# Patient Record
Sex: MALE | Race: WHITE | Employment: OTHER | URBAN - METROPOLITAN AREA
[De-identification: names, ages, dates, MRNs, and addresses within clinical notes are randomized per-mention and may not be internally consistent; named-entity substitution may affect disease eponyms.]

---

## 2017-05-17 ENCOUNTER — HOSPITAL ENCOUNTER (OUTPATIENT)
Dept: GENERAL RADIOLOGY | Age: 67
Discharge: HOME | End: 2017-05-17
Attending: ORTHOPAEDIC SURGERY

## 2017-05-17 DIAGNOSIS — M25.512 PAIN IN LEFT SHOULDER: ICD-10-CM

## 2017-05-17 PROCEDURE — 73030 X-RAY EXAM OF SHOULDER: CPT | Performed by: ORTHOPAEDIC SURGERY

## 2020-08-03 ENCOUNTER — OFFICE VISIT (OUTPATIENT)
Dept: INTERNAL MEDICINE CLINIC | Facility: CLINIC | Age: 70
End: 2020-08-03
Payer: COMMERCIAL

## 2020-08-03 VITALS
OXYGEN SATURATION: 98 % | DIASTOLIC BLOOD PRESSURE: 78 MMHG | RESPIRATION RATE: 19 BRPM | SYSTOLIC BLOOD PRESSURE: 130 MMHG | HEART RATE: 75 BPM | BODY MASS INDEX: 29.47 KG/M2 | HEIGHT: 67.5 IN | WEIGHT: 190 LBS | TEMPERATURE: 98 F

## 2020-08-03 DIAGNOSIS — D17.23 LIPOMA OF RIGHT LOWER EXTREMITY: ICD-10-CM

## 2020-08-03 DIAGNOSIS — Z00.00 GENERAL MEDICAL EXAM: Primary | ICD-10-CM

## 2020-08-03 DIAGNOSIS — D22.9 ATYPICAL NEVI: ICD-10-CM

## 2020-08-03 DIAGNOSIS — E66.3 OVERWEIGHT (BMI 25.0-29.9): ICD-10-CM

## 2020-08-03 DIAGNOSIS — L72.3 SEBACEOUS CYST OF LEFT AXILLA: ICD-10-CM

## 2020-08-03 DIAGNOSIS — I10 ESSENTIAL HYPERTENSION: ICD-10-CM

## 2020-08-03 PROCEDURE — 3075F SYST BP GE 130 - 139MM HG: CPT | Performed by: INTERNAL MEDICINE

## 2020-08-03 PROCEDURE — 3078F DIAST BP <80 MM HG: CPT | Performed by: INTERNAL MEDICINE

## 2020-08-03 PROCEDURE — 99204 OFFICE O/P NEW MOD 45 MIN: CPT | Performed by: INTERNAL MEDICINE

## 2020-08-03 PROCEDURE — 3008F BODY MASS INDEX DOCD: CPT | Performed by: INTERNAL MEDICINE

## 2020-08-03 RX ORDER — LISINOPRIL 20 MG/1
TABLET ORAL
COMMUNITY
Start: 2020-07-16 | End: 2021-03-22

## 2020-08-03 RX ORDER — METHYLPREDNISOLONE 4 MG/1
TABLET ORAL
COMMUNITY
Start: 2020-02-06 | End: 2020-10-02 | Stop reason: ALTCHOICE

## 2020-08-03 RX ORDER — FLUTICASONE PROPIONATE 50 MCG
SPRAY, SUSPENSION (ML) NASAL
COMMUNITY
Start: 2020-02-06 | End: 2020-10-02 | Stop reason: ALTCHOICE

## 2020-08-03 NOTE — PROGRESS NOTES
HPI:    Patient ID: Mackenzie Jones is a 81year old male. HPI   Here to establish care. Previous PCP, Dr. Boy Nielsen. H/o HTN . Stated he otherwise feels healthy. Home BP usually 120/80s.  Plays double tennis , does push ups with no chest pain, TRUONG., diz problems.             Current Outpatient Medications   Medication Sig Dispense Refill   • Fluticasone Propionate 50 MCG/ACT Nasal Suspension      • lisinopril 20 MG Oral Tab      • methylPREDNISolone 4 MG Oral Tablet Therapy Pack        Allergies:  Wasps if size increases. - DERM - INTERNAL      6. Sebaceous cyst of left axilla    - DERM - INTERNAL    7. Obtain old records including colonoscopy report.        Imaging & Referrals:  DERM - INTERNAL  DERM - INTERNAL         F/u in 4 mos

## 2020-08-04 ENCOUNTER — NURSE ONLY (OUTPATIENT)
Dept: INTERNAL MEDICINE CLINIC | Facility: CLINIC | Age: 70
End: 2020-08-04
Payer: COMMERCIAL

## 2020-08-04 DIAGNOSIS — Z12.5 SCREENING FOR PROSTATE CANCER: ICD-10-CM

## 2020-08-04 DIAGNOSIS — E66.3 OVERWEIGHT (BMI 25.0-29.9): ICD-10-CM

## 2020-08-04 DIAGNOSIS — I10 ESSENTIAL HYPERTENSION: Primary | ICD-10-CM

## 2020-08-04 DIAGNOSIS — Z00.00 GENERAL MEDICAL EXAM: ICD-10-CM

## 2020-08-04 PROCEDURE — 36415 COLL VENOUS BLD VENIPUNCTURE: CPT | Performed by: INTERNAL MEDICINE

## 2020-08-04 NOTE — PROGRESS NOTES
Patient presented in office today for fasting blood draw. Blood draw successful in right arm  Dane:  Cbc,cmp,lipid,psa  D. O.B verified   Orders per Doctor Co

## 2020-08-05 LAB
ABSOLUTE BASOPHILS: 58 CELLS/UL (ref 0–200)
ABSOLUTE EOSINOPHILS: 148 CELLS/UL (ref 15–500)
ABSOLUTE LYMPHOCYTES: 1839 CELLS/UL (ref 850–3900)
ABSOLUTE MONOCYTES: 482 CELLS/UL (ref 200–950)
ABSOLUTE NEUTROPHILS: 2772 CELLS/UL (ref 1500–7800)
ALBUMIN/GLOBULIN RATIO: 2 (CALC) (ref 1–2.5)
ALBUMIN: 4.3 G/DL (ref 3.6–5.1)
ALKALINE PHOSPHATASE: 59 U/L (ref 35–144)
ALT: 47 U/L (ref 9–46)
AST: 25 U/L (ref 10–35)
BASOPHILS: 1.1 %
BILIRUBIN, TOTAL: 0.7 MG/DL (ref 0.2–1.2)
BUN: 15 MG/DL (ref 7–25)
CALCIUM: 9.6 MG/DL (ref 8.6–10.3)
CARBON DIOXIDE: 29 MMOL/L (ref 20–32)
CHLORIDE: 107 MMOL/L (ref 98–110)
CHOL/HDLC RATIO: 4.3 (CALC)
CHOLESTEROL, TOTAL: 224 MG/DL
CREATININE: 1.02 MG/DL (ref 0.7–1.25)
EGFR IF AFRICN AM: 88 ML/MIN/1.73M2
EGFR IF NONAFRICN AM: 76 ML/MIN/1.73M2
EOSINOPHILS: 2.8 %
GLOBULIN: 2.2 G/DL (CALC) (ref 1.9–3.7)
GLUCOSE: 129 MG/DL (ref 65–99)
HDL CHOLESTEROL: 52 MG/DL
HEMATOCRIT: 43 % (ref 38.5–50)
HEMOGLOBIN: 14.9 G/DL (ref 13.2–17.1)
LDL-CHOLESTEROL: 145 MG/DL (CALC)
LYMPHOCYTES: 34.7 %
MCH: 30.2 PG (ref 27–33)
MCHC: 34.7 G/DL (ref 32–36)
MCV: 87 FL (ref 80–100)
MONOCYTES: 9.1 %
MPV: 10.2 FL (ref 7.5–12.5)
NEUTROPHILS: 52.3 %
NON-HDL CHOLESTEROL: 172 MG/DL (CALC)
PLATELET COUNT: 201 THOUSAND/UL (ref 140–400)
POTASSIUM: 5 MMOL/L (ref 3.5–5.3)
PROTEIN, TOTAL: 6.5 G/DL (ref 6.1–8.1)
PSA, TOTAL: 0.6 NG/ML
RDW: 12.8 % (ref 11–15)
RED BLOOD CELL COUNT: 4.94 MILLION/UL (ref 4.2–5.8)
SODIUM: 144 MMOL/L (ref 135–146)
TRIGLYCERIDES: 147 MG/DL
WHITE BLOOD CELL COUNT: 5.3 THOUSAND/UL (ref 3.8–10.8)

## 2020-08-05 NOTE — PATIENT INSTRUCTIONS
Lifestyle Modification:   (AVG. SBP = Average Systolic Blood Pressure)  Lifestyle Modification Recommendations  Modification Recommendation Avg.  SBP Reduction Range   Weight Reduction Maintain normal body weight (body mass index 18.5-24.9 kg/m2) 5-20 mmHg/ Choose foods from each of the food groups below each day. Try to get the recommended number of servings for each food group.  The serving numbers are based on a diet of 2,000 calories a day. Talk with your healthcare provider if you’re not sure about your c Best choices: Dry roasted nuts with no salt added, lentils, kidney beans, garbanzo beans, and whole lees beans.    Fats and oils  Servings: 2 to 3 a day  A serving is:  · 1 teaspoon vegetable oil  · 1 teaspoon soft margarine  · 1 tablespoon mayonnaise  · 2

## 2020-08-10 RX ORDER — ATORVASTATIN CALCIUM 20 MG/1
20 TABLET, FILM COATED ORAL NIGHTLY
Qty: 90 TABLET | Refills: 2 | Status: SHIPPED | OUTPATIENT
Start: 2020-08-10 | End: 2021-03-22

## 2020-08-13 ENCOUNTER — TELEPHONE (OUTPATIENT)
Dept: INTERNAL MEDICINE CLINIC | Facility: CLINIC | Age: 70
End: 2020-08-13

## 2020-08-13 NOTE — TELEPHONE ENCOUNTER
----- Message from Rekha Nunez MD sent at 8/10/2020 12:18 AM CDT -----  Please Call. Lab showed normal blood count. , prostate enzyme. Fasting glucose is high indicating he is pre diabetic. He should strictly reduce carbs intake.  Avoid bread, rice, pa

## 2020-10-03 DIAGNOSIS — Z12.11 COLON CANCER SCREENING: ICD-10-CM

## 2020-10-03 DIAGNOSIS — Z11.59 ENCOUNTER FOR HEPATITIS C SCREENING TEST FOR LOW RISK PATIENT: Primary | ICD-10-CM

## 2020-10-03 DIAGNOSIS — R73.01 FASTING HYPERGLYCEMIA: ICD-10-CM

## 2020-10-03 PROBLEM — E78.2 MIXED HYPERLIPIDEMIA: Status: ACTIVE | Noted: 2020-10-03

## 2020-10-03 PROBLEM — R79.89 ELEVATED LFTS: Status: ACTIVE | Noted: 2020-10-03

## 2020-10-12 ENCOUNTER — OFFICE VISIT (OUTPATIENT)
Dept: DERMATOLOGY CLINIC | Facility: CLINIC | Age: 70
End: 2020-10-12
Payer: MEDICARE

## 2020-10-12 DIAGNOSIS — D48.5 NEOPLASM OF UNCERTAIN BEHAVIOR OF SKIN: Primary | ICD-10-CM

## 2020-10-12 DIAGNOSIS — D23.5 BENIGN NEOPLASM OF SKIN OF TRUNK, EXCEPT SCROTUM: ICD-10-CM

## 2020-10-12 DIAGNOSIS — L82.1 SEBORRHEIC KERATOSES: ICD-10-CM

## 2020-10-12 DIAGNOSIS — L81.4 LENTIGO: ICD-10-CM

## 2020-10-12 DIAGNOSIS — D23.4 BENIGN NEOPLASM OF SCALP AND SKIN OF NECK: ICD-10-CM

## 2020-10-12 DIAGNOSIS — D22.9 MULTIPLE NEVI: ICD-10-CM

## 2020-10-12 DIAGNOSIS — D23.60 BENIGN NEOPLASM OF SKIN OF UPPER LIMB, INCLUDING SHOULDER, UNSPECIFIED LATERALITY: ICD-10-CM

## 2020-10-12 DIAGNOSIS — D23.30 BENIGN NEOPLASM OF SKIN OF FACE: ICD-10-CM

## 2020-10-12 PROCEDURE — 99202 OFFICE O/P NEW SF 15 MIN: CPT | Performed by: DERMATOLOGY

## 2020-10-12 PROCEDURE — 11102 TANGNTL BX SKIN SINGLE LES: CPT | Performed by: DERMATOLOGY

## 2020-10-19 NOTE — PROGRESS NOTES
Sadaf Thao is a 81year old male. HPI:     CC:  Patient presents with:  Full Skin Exam: New pt. pt presenting today with full body skin check. Denies family and personal HX of skin cancer.         Allergies:  Wasps    HISTORY:    Past Medical History: Social connections        Talks on phone: Not on file        Gets together: Not on file        Attends [employer] service: Not on file        Active member of club or organization: Not on file        Attends meetings of clubs or organizations: Not on bora conjunctival mucosa, eyelids, lips external ears, back, chest,/ breasts, axillae,  abdomen, arms, legs, palms.      Multiple light to medium brown, well marginated, uniformly pigmented, macules and papules 6 mm and less scattered on exam. pigmented lesions cherry angiomas:  Reassurance regarding other benign skin lesions. Signs and symptoms of skin cancer, ABCDE's of melanoma discussed with patient. Sunscreen use, sun protection, self exams reviewed.   Followup as noted RTC routine checkup 6 mos - one year or

## 2020-10-19 NOTE — PROGRESS NOTES
Operative Report                     Shave/  Tangential biopsy     Clinical diagnosis:    Size of lesion:    Location:Diagnosis/Clinical History: irregular dark brown lesion  Spec 1 Description >>>>>: upper back (left)  Spec 1 Comment: 5mm dark brown

## 2020-10-27 NOTE — PROGRESS NOTES
The pathology report from last visit showed  Atypical nevus. This is moderately atypical so should have re-excision. Suggest 30min appt. Not emergent. F/u for skin check in 3-4 mos . Please log in test results.   Please call patient and inform of results

## 2020-11-03 ENCOUNTER — TELEPHONE (OUTPATIENT)
Dept: DERMATOLOGY CLINIC | Facility: CLINIC | Age: 70
End: 2020-11-03

## 2020-11-03 NOTE — PROGRESS NOTES
Pt informed of pathology results and KMT's recommendations for treatment. Pt verbalized understanding and states he will have his spouse call our office back to schedule surgery with Melinda Velazquez. Pt gave verbal approval to speak with spouse.

## 2020-11-09 NOTE — PROGRESS NOTES
S/w pt's spouse as she called back to go over the procedure, spouse wanted to check \"if this is really necessary to remove\" informed that yes it is, not emergent but neccessary as per KMT'r order.   Spouse states she will discuss this further with her hus

## 2021-01-05 NOTE — PROGRESS NOTES
Called pt to follow up on surgery that he has not yet scheduled - pt states he wants to wait now due to Covid - please advise how long he can wait to have this excised?

## 2021-02-16 NOTE — PROGRESS NOTES
LMTCB - this pt needs to see [address] for 30 min re-excision by March 2021 - see below KMT's msgs. Please let him know when he calls back/schedule appt.

## 2021-03-13 DIAGNOSIS — Z23 NEED FOR VACCINATION: ICD-10-CM

## 2021-03-22 ENCOUNTER — HOSPITAL ENCOUNTER (OUTPATIENT)
Facility: HOSPITAL | Age: 71
Setting detail: OBSERVATION
Discharge: HOME | End: 2021-03-23
Admitting: HOSPITALIST
Payer: MEDICARE

## 2021-03-22 ENCOUNTER — APPOINTMENT (OUTPATIENT)
Dept: CT IMAGING | Facility: HOSPITAL | Age: 71
End: 2021-03-22
Attending: Other
Payer: MEDICARE

## 2021-03-22 ENCOUNTER — OFFICE VISIT (OUTPATIENT)
Dept: INTERNAL MEDICINE CLINIC | Facility: CLINIC | Age: 71
End: 2021-03-22
Payer: MEDICARE

## 2021-03-22 ENCOUNTER — APPOINTMENT (OUTPATIENT)
Dept: CT IMAGING | Facility: HOSPITAL | Age: 71
End: 2021-03-22
Payer: MEDICARE

## 2021-03-22 ENCOUNTER — APPOINTMENT (OUTPATIENT)
Dept: MRI IMAGING | Facility: HOSPITAL | Age: 71
End: 2021-03-22
Attending: EMERGENCY MEDICINE
Payer: MEDICARE

## 2021-03-22 VITALS
HEIGHT: 67 IN | HEART RATE: 72 BPM | BODY MASS INDEX: 31.23 KG/M2 | DIASTOLIC BLOOD PRESSURE: 82 MMHG | SYSTOLIC BLOOD PRESSURE: 143 MMHG | OXYGEN SATURATION: 96 % | WEIGHT: 199 LBS

## 2021-03-22 DIAGNOSIS — R20.0 NUMBNESS AND TINGLING OF RIGHT ARM AND LEG: Primary | ICD-10-CM

## 2021-03-22 DIAGNOSIS — R20.2 NUMBNESS AND TINGLING OF RIGHT ARM AND LEG: Primary | ICD-10-CM

## 2021-03-22 DIAGNOSIS — G45.9 BRAIN TIA: Primary | ICD-10-CM

## 2021-03-22 DIAGNOSIS — I10 ESSENTIAL HYPERTENSION: ICD-10-CM

## 2021-03-22 LAB
ANION GAP SERPL CALC-SCNC: 8 MMOL/L (ref 0–18)
BASOPHILS # BLD AUTO: 0.04 X10(3) UL (ref 0–0.2)
BASOPHILS NFR BLD AUTO: 0.8 %
BUN BLD-MCNC: 18 MG/DL (ref 7–18)
BUN/CREAT SERPL: 19.4 (ref 10–20)
CALCIUM BLD-MCNC: 8.8 MG/DL (ref 8.5–10.1)
CHLORIDE SERPL-SCNC: 109 MMOL/L (ref 98–112)
CHOLEST SMN-MCNC: 209 MG/DL (ref ?–200)
CO2 SERPL-SCNC: 25 MMOL/L (ref 21–32)
CREAT BLD-MCNC: 0.93 MG/DL
DEPRECATED RDW RBC AUTO: 40.5 FL (ref 35.1–46.3)
EOSINOPHIL # BLD AUTO: 0.05 X10(3) UL (ref 0–0.7)
EOSINOPHIL NFR BLD AUTO: 1 %
ERYTHROCYTE [DISTWIDTH] IN BLOOD BY AUTOMATED COUNT: 12.9 % (ref 11–15)
GLUCOSE BLD-MCNC: 104 MG/DL (ref 70–99)
GLUCOSE BLDC GLUCOMTR-MCNC: 104 MG/DL (ref 70–99)
GLUCOSE BLDC GLUCOMTR-MCNC: 120 MG/DL (ref 70–99)
HCT VFR BLD AUTO: 40.4 %
HDLC SERPL-MCNC: 63 MG/DL (ref 40–59)
HGB BLD-MCNC: 13.7 G/DL
IMM GRANULOCYTES # BLD AUTO: 0.01 X10(3) UL (ref 0–1)
IMM GRANULOCYTES NFR BLD: 0.2 %
LDLC SERPL CALC-MCNC: 107 MG/DL (ref ?–100)
LYMPHOCYTES # BLD AUTO: 1.18 X10(3) UL (ref 1–4)
LYMPHOCYTES NFR BLD AUTO: 24.2 %
MCH RBC QN AUTO: 29.5 PG (ref 26–34)
MCHC RBC AUTO-ENTMCNC: 33.9 G/DL (ref 31–37)
MCV RBC AUTO: 87.1 FL
MONOCYTES # BLD AUTO: 0.47 X10(3) UL (ref 0.1–1)
MONOCYTES NFR BLD AUTO: 9.6 %
NEUTROPHILS # BLD AUTO: 3.13 X10 (3) UL (ref 1.5–7.7)
NEUTROPHILS # BLD AUTO: 3.13 X10(3) UL (ref 1.5–7.7)
NEUTROPHILS NFR BLD AUTO: 64.2 %
NONHDLC SERPL-MCNC: 146 MG/DL (ref ?–130)
OSMOLALITY SERPL CALC.SUM OF ELEC: 296 MOSM/KG (ref 275–295)
PLATELET # BLD AUTO: 186 10(3)UL (ref 150–450)
POTASSIUM SERPL-SCNC: 3.9 MMOL/L (ref 3.5–5.1)
RBC # BLD AUTO: 4.64 X10(6)UL
SARS-COV-2 RNA RESP QL NAA+PROBE: NOT DETECTED
SODIUM SERPL-SCNC: 142 MMOL/L (ref 136–145)
TRIGL SERPL-MCNC: 193 MG/DL (ref 30–149)
VLDLC SERPL CALC-MCNC: 39 MG/DL (ref 0–30)
WBC # BLD AUTO: 4.9 X10(3) UL (ref 4–11)

## 2021-03-22 PROCEDURE — 3077F SYST BP >= 140 MM HG: CPT | Performed by: INTERNAL MEDICINE

## 2021-03-22 PROCEDURE — 3008F BODY MASS INDEX DOCD: CPT | Performed by: INTERNAL MEDICINE

## 2021-03-22 PROCEDURE — 99213 OFFICE O/P EST LOW 20 MIN: CPT | Performed by: INTERNAL MEDICINE

## 2021-03-22 PROCEDURE — 99220 INITIAL OBSERVATION CARE,LEVL III: CPT | Performed by: HOSPITALIST

## 2021-03-22 PROCEDURE — 70551 MRI BRAIN STEM W/O DYE: CPT | Performed by: EMERGENCY MEDICINE

## 2021-03-22 PROCEDURE — 70496 CT ANGIOGRAPHY HEAD: CPT | Performed by: OTHER

## 2021-03-22 PROCEDURE — 70450 CT HEAD/BRAIN W/O DYE: CPT

## 2021-03-22 PROCEDURE — 3079F DIAST BP 80-89 MM HG: CPT | Performed by: INTERNAL MEDICINE

## 2021-03-22 PROCEDURE — 99204 OFFICE O/P NEW MOD 45 MIN: CPT | Performed by: OTHER

## 2021-03-22 PROCEDURE — 70498 CT ANGIOGRAPHY NECK: CPT | Performed by: OTHER

## 2021-03-22 RX ORDER — ASPIRIN 81 MG/1
324 TABLET, CHEWABLE ORAL ONCE
Status: DISCONTINUED | OUTPATIENT
Start: 2021-03-22 | End: 2021-03-22

## 2021-03-22 RX ORDER — ASPIRIN 81 MG/1
81 TABLET, CHEWABLE ORAL DAILY
Status: DISCONTINUED | OUTPATIENT
Start: 2021-03-22 | End: 2021-03-23

## 2021-03-22 RX ORDER — LABETALOL HYDROCHLORIDE 5 MG/ML
10 INJECTION, SOLUTION INTRAVENOUS EVERY 10 MIN PRN
Status: DISCONTINUED | OUTPATIENT
Start: 2021-03-22 | End: 2021-03-23

## 2021-03-22 RX ORDER — CLOPIDOGREL BISULFATE 75 MG/1
75 TABLET ORAL DAILY
Status: DISCONTINUED | OUTPATIENT
Start: 2021-03-23 | End: 2021-03-23

## 2021-03-22 RX ORDER — METOCLOPRAMIDE HYDROCHLORIDE 5 MG/ML
10 INJECTION INTRAMUSCULAR; INTRAVENOUS EVERY 8 HOURS PRN
Status: DISCONTINUED | OUTPATIENT
Start: 2021-03-22 | End: 2021-03-23

## 2021-03-22 RX ORDER — ASPIRIN 325 MG
325 TABLET ORAL DAILY
Status: CANCELLED | OUTPATIENT
Start: 2021-03-22

## 2021-03-22 RX ORDER — DOCUSATE SODIUM 100 MG/1
100 CAPSULE, LIQUID FILLED ORAL 2 TIMES DAILY PRN
Status: DISCONTINUED | OUTPATIENT
Start: 2021-03-22 | End: 2021-03-23

## 2021-03-22 RX ORDER — ATORVASTATIN CALCIUM 40 MG/1
40 TABLET, FILM COATED ORAL NIGHTLY
Status: DISCONTINUED | OUTPATIENT
Start: 2021-03-22 | End: 2021-03-23

## 2021-03-22 RX ORDER — TEMAZEPAM 15 MG/1
15 CAPSULE ORAL NIGHTLY PRN
Status: DISCONTINUED | OUTPATIENT
Start: 2021-03-22 | End: 2021-03-23

## 2021-03-22 RX ORDER — ACETAMINOPHEN 325 MG/1
650 TABLET ORAL EVERY 4 HOURS PRN
Status: DISCONTINUED | OUTPATIENT
Start: 2021-03-22 | End: 2021-03-23

## 2021-03-22 RX ORDER — ACETAMINOPHEN 325 MG/1
650 TABLET ORAL EVERY 6 HOURS PRN
Status: DISCONTINUED | OUTPATIENT
Start: 2021-03-22 | End: 2021-03-23

## 2021-03-22 RX ORDER — ASPIRIN 81 MG/1
81 TABLET ORAL DAILY
Status: DISCONTINUED | OUTPATIENT
Start: 2021-03-23 | End: 2021-03-23

## 2021-03-22 RX ORDER — CLOPIDOGREL BISULFATE 75 MG/1
75 TABLET ORAL ONCE
Status: COMPLETED | OUTPATIENT
Start: 2021-03-22 | End: 2021-03-22

## 2021-03-22 RX ORDER — METOCLOPRAMIDE HYDROCHLORIDE 5 MG/ML
5 INJECTION INTRAMUSCULAR; INTRAVENOUS EVERY 8 HOURS PRN
Status: DISCONTINUED | OUTPATIENT
Start: 2021-03-22 | End: 2021-03-23

## 2021-03-22 RX ORDER — HYDRALAZINE HYDROCHLORIDE 20 MG/ML
10 INJECTION INTRAMUSCULAR; INTRAVENOUS EVERY 2 HOUR PRN
Status: DISCONTINUED | OUTPATIENT
Start: 2021-03-22 | End: 2021-03-23

## 2021-03-22 RX ORDER — ACETAMINOPHEN 650 MG/1
650 SUPPOSITORY RECTAL EVERY 4 HOURS PRN
Status: DISCONTINUED | OUTPATIENT
Start: 2021-03-22 | End: 2021-03-23

## 2021-03-22 RX ORDER — ONDANSETRON 2 MG/ML
4 INJECTION INTRAMUSCULAR; INTRAVENOUS EVERY 6 HOURS PRN
Status: DISCONTINUED | OUTPATIENT
Start: 2021-03-22 | End: 2021-03-23

## 2021-03-22 RX ORDER — LISINOPRIL 20 MG/1
20 TABLET ORAL DAILY
Status: DISCONTINUED | OUTPATIENT
Start: 2021-03-22 | End: 2021-03-23

## 2021-03-22 RX ORDER — ASPIRIN 300 MG
300 SUPPOSITORY, RECTAL RECTAL DAILY
Status: CANCELLED | OUTPATIENT
Start: 2021-03-22

## 2021-03-22 NOTE — H&P
[facility]    PATIENT'S NAME: Jaylin Petty   ATTENDING PHYSICIAN: Benjamin Deleon.  Dortha Cheadle, MD   PATIENT ACCOUNT#:   [id number]    LOCATION:  [place] [address]  MEDICAL RECORD #:   [id number]       YOB: 1950  ADMISSION DATE:       03/22 the patient. No visual disturbance. No diplopia. Other 12-point review of systems is negative. PHYSICAL EXAMINATION:  GENERAL:  Alert and oriented to time, place, and person; in no acute distress.   VITAL SIGNS:  Temperature is 97.8, pulse 69, respira

## 2021-03-22 NOTE — PROGRESS NOTES
Met with pt's spouse Armond Cordova and spent some time in hospital chapel for prayer during pt's initial treatment for numbness. Escorted her to pt's bedside when ED staff indicated that the spouse was free to come back.   Pt's indicated that he was feeling better

## 2021-03-22 NOTE — ED NOTES
Orders for admission, patient is aware of plan and ready to go upstairs.  Any questions, please call ED [facility]  at extension [phone]   Type of COVID test sent: rapid  COVID Suspicion level: Low     Titratable drug(s) infusing:  Rate:    LOC at time of transp

## 2021-03-22 NOTE — ED NOTES
Patient resting on cot, breathing with ease, in no apparent distress. Patient updated on dirty bed delays, will continue to monitor.

## 2021-03-22 NOTE — ED NOTES
Assumed care of patient from triage. Patient to ED from home for right sided numbness. Patient states that he started having numbness to right hand which then proceeded to go to entire right side at approximately 1315.  Patient reports that symptoms are imp

## 2021-03-22 NOTE — ED INITIAL ASSESSMENT (HPI)
Patient aox3 to ed via private vehicle patient sent by dr roche for r/o cva. Patient reported has sudden onset of right sided numbness/tingling to arms and legs. No facial droop noted. Equal hand  bilaterally, no pronator drift noted.  Denies headache +di

## 2021-03-22 NOTE — ED PROVIDER NOTES
Patient Seen in: [facility] AND [facility] Emergency Department      History   Patient presents with:  Numbness Weakness    Stated Complaint: possible stroke alert    HPI/Subjective:   HPI    Patient presents to the emergency department with right-sided paresthe Extraocular Movements: Extraocular movements intact. Conjunctiva/sclera: Conjunctivae normal.   Cardiovascular:      Rate and Rhythm: Normal rate and regular rhythm. Heart sounds: No murmur heard.      Pulmonary:      Effort: Pulmonary effort is n bpm  Rhythm: Sinus Rhythm  Reading: Normal EKG                       MDM      Pulse Ox: 93%, Normal,     Cardiac Monitor: Pulse Readings from Last 1 Encounters:  03/22/21 : 69  , sinus,      Radiology findings: CT STROKE BRAIN (NO IV)(CPT=70450)    Result

## 2021-03-22 NOTE — PROGRESS NOTES
HPI:    Patient ID: Bobbi Stratton is a 78year old male. HPI    Patient has history of hypertension, hyperlipidemia (refuse medication) opted lifestyle modification.   Presents today  With wife, c/o right arm a, hand numbness, later extending to right lo from Last 3 Encounters:  03/22/21 : 143/82  03/22/21 : 156/89  10/02/20 : 128/80       Physical Exam  Vitals and nursing note reviewed. Constitutional:       Appearance: Normal appearance. HENT:      Head: Normocephalic.       Comments: No facial asymme Smoking Tobacco Use: Never Smoker      Smokeless Tobacco Use: Never Used Ed to assess       No orders of the defined types were placed in this encounter.       Meds This Visit:  Requested Prescriptions      No prescriptions requested or ordered in this enco

## 2021-03-22 NOTE — CONSULTS
[facility]  [address] [facility], [address] [place] [address]  [phone]          [address] [facility]    Report of Consultation    Alxe Kimbrough Patient Status:  Emergency Number of children: Not on file      Years of education: Not on file      Highest education level: Not on file    Tobacco Use      Smoking status: Never Smoker      Smokeless tobacco: Never Used    Substance and Sexual Activity      Alcohol use:  Yes joint pain, swelling or joint deformities. Integumentary:  No prominent skin lesions have developed. There has been no significant pruritus or skin rash. Endocrine:  No excessive thirst, urination, weight loss or gain.    Hematological and Lymphatic:  N Lymphocytes Absolute      1.00 - 4.00 x10(3) uL 1.18   Monocytes Absolute      0.10 - 1.00 x10(3) uL 0.47   Eosinophils Absolute      0.00 - 0.70 x10(3) uL 0.05   Basophils Absolute      0.00 - 0.20 x10(3) uL 0.04   Immature Granulocyte Absolute      0.0 Staff Neurologist   3/22/2021  5:18 PM

## 2021-03-23 ENCOUNTER — APPOINTMENT (OUTPATIENT)
Dept: CV DIAGNOSTICS | Facility: HOSPITAL | Age: 71
End: 2021-03-23
Attending: HOSPITALIST
Payer: MEDICARE

## 2021-03-23 VITALS
OXYGEN SATURATION: 97 % | DIASTOLIC BLOOD PRESSURE: 65 MMHG | BODY MASS INDEX: 28 KG/M2 | WEIGHT: 180.31 LBS | TEMPERATURE: 98 F | RESPIRATION RATE: 16 BRPM | HEART RATE: 65 BPM | SYSTOLIC BLOOD PRESSURE: 120 MMHG

## 2021-03-23 LAB
ANION GAP SERPL CALC-SCNC: 3 MMOL/L (ref 0–18)
BASOPHILS # BLD AUTO: 0.04 X10(3) UL (ref 0–0.2)
BASOPHILS NFR BLD AUTO: 0.7 %
BUN BLD-MCNC: 18 MG/DL (ref 7–18)
BUN/CREAT SERPL: 18.2 (ref 10–20)
CALCIUM BLD-MCNC: 9 MG/DL (ref 8.5–10.1)
CHLORIDE SERPL-SCNC: 108 MMOL/L (ref 98–112)
CO2 SERPL-SCNC: 31 MMOL/L (ref 21–32)
CREAT BLD-MCNC: 0.99 MG/DL
DEPRECATED RDW RBC AUTO: 41.1 FL (ref 35.1–46.3)
EOSINOPHIL # BLD AUTO: 0.14 X10(3) UL (ref 0–0.7)
EOSINOPHIL NFR BLD AUTO: 2.3 %
ERYTHROCYTE [DISTWIDTH] IN BLOOD BY AUTOMATED COUNT: 13 % (ref 11–15)
EST. AVERAGE GLUCOSE BLD GHB EST-MCNC: 131 MG/DL (ref 68–126)
GLUCOSE BLD-MCNC: 120 MG/DL (ref 70–99)
GLUCOSE BLDC GLUCOMTR-MCNC: 113 MG/DL (ref 70–99)
GLUCOSE BLDC GLUCOMTR-MCNC: 115 MG/DL (ref 70–99)
GLUCOSE BLDC GLUCOMTR-MCNC: 117 MG/DL (ref 70–99)
HBA1C MFR BLD HPLC: 6.2 % (ref ?–5.7)
HCT VFR BLD AUTO: 40.2 %
HGB BLD-MCNC: 13.7 G/DL
IMM GRANULOCYTES # BLD AUTO: 0.02 X10(3) UL (ref 0–1)
IMM GRANULOCYTES NFR BLD: 0.3 %
LYMPHOCYTES # BLD AUTO: 1.72 X10(3) UL (ref 1–4)
LYMPHOCYTES NFR BLD AUTO: 28.5 %
MCH RBC QN AUTO: 29.7 PG (ref 26–34)
MCHC RBC AUTO-ENTMCNC: 34.1 G/DL (ref 31–37)
MCV RBC AUTO: 87 FL
MONOCYTES # BLD AUTO: 0.68 X10(3) UL (ref 0.1–1)
MONOCYTES NFR BLD AUTO: 11.3 %
NEUTROPHILS # BLD AUTO: 3.44 X10 (3) UL (ref 1.5–7.7)
NEUTROPHILS # BLD AUTO: 3.44 X10(3) UL (ref 1.5–7.7)
NEUTROPHILS NFR BLD AUTO: 56.9 %
OSMOLALITY SERPL CALC.SUM OF ELEC: 297 MOSM/KG (ref 275–295)
PLATELET # BLD AUTO: 195 10(3)UL (ref 150–450)
POTASSIUM SERPL-SCNC: 4 MMOL/L (ref 3.5–5.1)
RBC # BLD AUTO: 4.62 X10(6)UL
SODIUM SERPL-SCNC: 142 MMOL/L (ref 136–145)
WBC # BLD AUTO: 6 X10(3) UL (ref 4–11)

## 2021-03-23 PROCEDURE — 93306 TTE W/DOPPLER COMPLETE: CPT | Performed by: HOSPITALIST

## 2021-03-23 PROCEDURE — 99213 OFFICE O/P EST LOW 20 MIN: CPT | Performed by: OTHER

## 2021-03-23 PROCEDURE — 99217 OBSERVATION CARE DISCHARGE: CPT | Performed by: INTERNAL MEDICINE

## 2021-03-23 RX ORDER — ASPIRIN 81 MG/1
81 TABLET, CHEWABLE ORAL DAILY
Qty: 30 TABLET | Refills: 0 | Status: SHIPPED | OUTPATIENT
Start: 2021-03-24 | End: 2021-03-30

## 2021-03-23 RX ORDER — ATORVASTATIN CALCIUM 80 MG/1
80 TABLET, FILM COATED ORAL NIGHTLY
Qty: 30 TABLET | Refills: 0 | Status: SHIPPED | OUTPATIENT
Start: 2021-03-23 | End: 2021-03-30

## 2021-03-23 RX ORDER — CLOPIDOGREL BISULFATE 75 MG/1
75 TABLET ORAL DAILY
Status: DISCONTINUED | OUTPATIENT
Start: 2021-03-23 | End: 2021-03-23

## 2021-03-23 RX ORDER — ATORVASTATIN CALCIUM 40 MG/1
40 TABLET, FILM COATED ORAL NIGHTLY
Qty: 30 TABLET | Refills: 0 | Status: SHIPPED | OUTPATIENT
Start: 2021-03-23 | End: 2021-03-23

## 2021-03-23 RX ORDER — CLOPIDOGREL BISULFATE 75 MG/1
75 TABLET ORAL DAILY
Qty: 30 TABLET | Refills: 0 | Status: SHIPPED | OUTPATIENT
Start: 2021-03-24 | End: 2021-04-23

## 2021-03-23 RX ORDER — LISINOPRIL 20 MG/1
20 TABLET ORAL DAILY
Qty: 30 TABLET | Refills: 0 | Status: SHIPPED | OUTPATIENT
Start: 2021-03-24 | End: 2021-03-30

## 2021-03-23 RX ORDER — ATORVASTATIN CALCIUM 40 MG/1
80 TABLET, FILM COATED ORAL NIGHTLY
Status: DISCONTINUED | OUTPATIENT
Start: 2021-03-23 | End: 2021-03-23

## 2021-03-23 NOTE — CM/SW NOTE
Received MDO for Home Health Evaluation. SW met w/ pt in his room. He verified his address and confirmed living w/ his wife. They live in a home w/ 2 levels.  There are approx 12 steps (w/ a landing) up to the 2nd level and there is 1 step to enter the h

## 2021-03-23 NOTE — DISCHARGE SUMMARY
[facility]    Discharge Summary    Sadaf Thao Patient Status:  Observation    1950 MRN [id number]   Location [facility] 3W/SW Attending Thuan Coppola MD   Hosp Day # 0 PCP Katy Gomez MD     Date of Admission: 3/2 Exam:     03/23/21  0600 03/23/21  0842 03/23/21  1000 03/23/21  1400   BP: 131/82 135/81 128/79 120/65   BP Location: Right arm Right arm Right arm Right arm   Pulse: 62 65 63 65   Resp: 16 16 16 16   Temp: 98.4 °F (36.9 °C) 98.1 °F (36.7 °C) 98.1 °F (36. at 2:44 PM     Finalized by (CST): Cristy Renee MD on 3/22/2021 at 2:51 PM          CTA BRAIN (HMD=67751)    Result Date: 3/22/2021  CONCLUSION:  No significant intracranial arterial stenosis, occlusion or aneurysm.     Dictated by (CST): Gianna Waldron 195.0     Recent Labs   Lab 03/22/21  1530 03/23/21  0502   * 120*   BUN 18 18   CREATSERUM 0.93 0.99   GFRAA 97 90   GFRNAA 84 78   CA 8.8 9.0    142   K 3.9 4.0    108   CO2 25.0 31.0     No results found for: PT, INR    Disposition: D

## 2021-03-23 NOTE — OCCUPATIONAL THERAPY NOTE
OT orders received and chart reviewed. RN contacted and approving of pt participation in therapy. Pt received in bed, alert and oriented x 4.  Pt reporting that all symptoms resolved and that he has been attending to personal care needs w/o staff assist. Pt

## 2021-03-23 NOTE — PROGRESS NOTES
[facility]  [address] [facility], [address] [place] [address]  [phone]          INPATIENT NEUROLOGY   FOLLOW UP CONSULT NOTE       [facility]    Report of Consultation    Shanna Ward Patient Status:  Observation 1. No significant flow limiting stenosis or occlusion of the major cervical arteries. 2. Mild calcific atherosclerosis involving the bilateral carotid bifurcations.       I PERSONALLY REVIEWED THESE IMAGES AND AGREE WITH THE IMPRESSION.      ASSESSMENT:

## 2021-03-23 NOTE — SLP NOTE
ADULT SWALLOWING EVALUATION    ASSESSMENT    ASSESSMENT/OVERALL IMPRESSION:      PPE REQUIRED. THIS SLP WORE GLOVES AND DROPLET MASK. HANDS SANITIZED/WASHED UPON ENTRANCE/EXIT. This BSE was ordered d/t stroke protocol. No PMH of dysphagia at [facility].  Pt o from speech services for swallowing intervention secondary to functional swallowing skills. RECOMMENDATIONS   Diet Recommendations - Solids: Regular  Diet Recommendations - Liquid:  Thin    Compensatory Strategies Recommended:  (reviewed standard swal 0  Follow Up Needed: No  SLP Follow-up Date: 03/23/21    Thank you for your referral.   If you have any questions, please contact     Judah Mendez M.S. YAZMIN/SLP  Speech-Language Pathologist  [facility]  #[id number]

## 2021-03-23 NOTE — PLAN OF CARE
Problem: Patient Centered Care  Goal: Patient preferences are identified and integrated in the patient's plan of care  Description: Interventions:  - What would you like us to know as we care for you?  Runs a restaurant  - Provide timely, complete, and ac limitations  - Instruct pt to call for assistance with activity based on assessment  - Modify environment to reduce risk of injury  - Provide assistive devices as appropriate  - Consider OT/PT consult to assist with strengthening/mobility  - Encourage toil

## 2021-03-23 NOTE — PHYSICAL THERAPY NOTE
Chart reviewed pt and spoke with RN who gave approval for treatment. Pt received supine in bed. Pt reported all symptoms that prompted admission had resolved, no residual weakness.  Pt performed bed mobility, transfers, gait, stair negotiation IND with no L

## 2021-03-23 NOTE — PLAN OF CARE
In CT waiting room patient had a brief episode of tingling and numbness on right hand and then symptoms disappeared. MD notified. Alert and oriented X4, NS, NIH daily, neuro checks.  Plan: US carotid, 2D echo, PT/OT eval, speech eval    Problem: Patient [place] Assess pt frequently for physical needs  - Identify cognitive and physical deficits and behaviors that affect risk of falls.   - [place] fall precautions as indicated by assessment.  - Educate pt/family on patient safety including physical limitations  -

## 2021-03-24 ENCOUNTER — PATIENT OUTREACH (OUTPATIENT)
Dept: CASE MANAGEMENT | Age: 71
End: 2021-03-24

## 2021-03-30 ENCOUNTER — OFFICE VISIT (OUTPATIENT)
Dept: INTERNAL MEDICINE CLINIC | Facility: CLINIC | Age: 71
End: 2021-03-30
Payer: MEDICARE

## 2021-03-30 VITALS
OXYGEN SATURATION: 98 % | WEIGHT: 181 LBS | BODY MASS INDEX: 28.41 KG/M2 | HEIGHT: 67 IN | DIASTOLIC BLOOD PRESSURE: 85 MMHG | SYSTOLIC BLOOD PRESSURE: 150 MMHG | HEART RATE: 70 BPM

## 2021-03-30 DIAGNOSIS — I63.81 LACUNAR INFARCTION (HCC): ICD-10-CM

## 2021-03-30 DIAGNOSIS — R73.03 PREDIABETES: ICD-10-CM

## 2021-03-30 DIAGNOSIS — I51.89 DIASTOLIC DYSFUNCTION WITHOUT HEART FAILURE: ICD-10-CM

## 2021-03-30 DIAGNOSIS — E78.2 MIXED HYPERLIPIDEMIA: ICD-10-CM

## 2021-03-30 DIAGNOSIS — I10 ESSENTIAL HYPERTENSION: ICD-10-CM

## 2021-03-30 DIAGNOSIS — G45.9 TIA (TRANSIENT ISCHEMIC ATTACK): Primary | ICD-10-CM

## 2021-03-30 PROCEDURE — 99496 TRANSJ CARE MGMT HIGH F2F 7D: CPT | Performed by: INTERNAL MEDICINE

## 2021-03-30 PROCEDURE — 3077F SYST BP >= 140 MM HG: CPT | Performed by: INTERNAL MEDICINE

## 2021-03-30 PROCEDURE — 3008F BODY MASS INDEX DOCD: CPT | Performed by: INTERNAL MEDICINE

## 2021-03-30 PROCEDURE — 1111F DSCHRG MED/CURRENT MED MERGE: CPT | Performed by: INTERNAL MEDICINE

## 2021-03-30 PROCEDURE — 3079F DIAST BP 80-89 MM HG: CPT | Performed by: INTERNAL MEDICINE

## 2021-03-30 RX ORDER — ATORVASTATIN CALCIUM 80 MG/1
80 TABLET, FILM COATED ORAL NIGHTLY
Qty: 90 TABLET | Refills: 2 | Status: SHIPPED | OUTPATIENT
Start: 2021-03-30 | End: 2021-04-29

## 2021-03-30 RX ORDER — LISINOPRIL 20 MG/1
20 TABLET ORAL DAILY
Qty: 90 TABLET | Refills: 2 | Status: SHIPPED | OUTPATIENT
Start: 2021-03-30 | End: 2021-04-29

## 2021-03-30 RX ORDER — ASPIRIN 81 MG/1
81 TABLET, CHEWABLE ORAL DAILY
Qty: 90 TABLET | Refills: 2 | Status: SHIPPED | OUTPATIENT
Start: 2021-03-30 | End: 2021-04-29

## 2021-03-30 NOTE — PATIENT INSTRUCTIONS
Eating Heart-Healthy Food: Using the [address] for your heart doesn’t have to be hard or boring. You just need to know how to make healthier choices. The DASH eating plan has been developed to help you do just that.  DASH stands for Dietary Appr and low-fat cheeses.         Lean meats, poultry, fish  Servings: 6 or fewer a day  A serving is:  · 1 ounce cooked meats, poultry, or fish  · 1 egg  Best choices: Lean poultry and fish. Trim away visible fat.  Broil, grill, roast, or boil instead of frying Your blood pressure can be unhealthy even if it stays only slightly above 120/80 mm Hg. The higher above that level, the greater your health risk. Over time, high blood pressure makes your heart work too hard.  This can cause stroke, heart disease, heart fa Doing the DASH  The DASH diet is a 2,000-calorie diet that includes:  · Six to 8 daily servings of grains and grain products, such as whole-wheat bread, cereal, oatmeal, crackers, unsalted pretzels, and popcorn.  A serving size is 1 slice of bread, 1 cup of to be choosier at [facility]. Also, if it's very different from what you normally eat, it may be hard to adjust.  If you're serious about following the diet, it's a good idea to work with a registered dietitian (RD) for support and guidance.  (For the There is no one specific diet that is right for everyone with diabetes. But there are general guidelines to follow. A registered dietitian (RD) will create a tailored diet approach that’s just right for you.  He or she will also help you plan healthy meals contribute to high blood pressure, which can cause heart disease. People with diabetes already have a risk for high blood pressure and heart disease. · Stay at a healthy weight. If you need to lose weight, cut down on your portion sizes.  But don’t skip me

## 2021-03-30 NOTE — PROGRESS NOTES
HPI:    Mackenzie Jones is a 78year old male here today for hospital follow up.    He was discharged from Inpatient hospital[facility]  to Home   Admission Date: 3/22/21   Discharge Date: 3/23/21    Hospital Discharge Diagnoses (since 2/28/2021) lacunar infarcts in the left internal capsule and right caudate nucleus. Mild chronic appearing deep white matter ischemic change in the periventricular white matter bilaterally. Moderate-sized arachnoid cyst in the left middle cranial fossa.   Patient wa 28.35 kg/m² as calculated from the following:    Height as of this encounter: 5' 7\" (1.702 m). Weight as of this encounter: 181 lb (82.1 kg).    /85 (BP Location: Right arm, Patient Position: Sitting, Cuff Size: adult)   Pulse 70   Ht 5' 7\" (1.70 at 8:06 PM           MRI BRAIN WO ACUTE (3) SEQUENCE (CPT=70551)     Result Date: 3/22/2021  CONCLUSION:   Limited 3-sequence stroke protocol study was performed. Within these parameters:  1. No acute intracranial process.  2. Stable mild generalized atroph 0. 68 0.47   Eosinophils Absolute      0.00 - 0.70 x10(3) uL 0.14 0.05   Basophils Absolute      0.00 - 0.20 x10(3) uL 0.04 0.04   Immature Granulocyte Absolute      0.00 - 1.00 x10(3) uL 0.02 0.01   Neutrophils %      % 56.9 64.2   Lymphocytes %      % 28. antiplatelet therapy for 21 days and transition to aspirin 81 mg daily thereafter.    -Atorvastatin 80 mg daily  -Continue to monitor for symptoms    -     NEURO - INTERNAL    Lacunar infarction (HCC)  -Chronic lacunar infarct in the head of right caudate Care Management Certification:  I certify that the following are true:  Communication with the patient was made within 2 business days of discharge on date above   Medical Decision Making- Based on service period of discharge to 30 days:   · Number of Poss

## 2021-04-01 PROBLEM — R73.03 PREDIABETES: Status: ACTIVE | Noted: 2021-04-01

## 2021-04-01 PROBLEM — I63.81 LACUNAR INFARCTION (HCC): Status: ACTIVE | Noted: 2021-04-01

## 2021-04-01 PROBLEM — I51.89 DIASTOLIC DYSFUNCTION WITHOUT HEART FAILURE: Status: ACTIVE | Noted: 2021-04-01

## 2021-04-28 ENCOUNTER — OFFICE VISIT (OUTPATIENT)
Dept: NEUROLOGY | Facility: CLINIC | Age: 71
End: 2021-04-28
Payer: MEDICARE

## 2021-04-28 VITALS
HEIGHT: 69 IN | BODY MASS INDEX: 26.81 KG/M2 | DIASTOLIC BLOOD PRESSURE: 70 MMHG | HEART RATE: 64 BPM | SYSTOLIC BLOOD PRESSURE: 120 MMHG | WEIGHT: 181 LBS

## 2021-04-28 DIAGNOSIS — G45.9 TIA (TRANSIENT ISCHEMIC ATTACK): Primary | ICD-10-CM

## 2021-04-28 DIAGNOSIS — G93.0 INTRACRANIAL ARACHNOID CYST: ICD-10-CM

## 2021-04-28 PROCEDURE — 3008F BODY MASS INDEX DOCD: CPT | Performed by: OTHER

## 2021-04-28 PROCEDURE — 3074F SYST BP LT 130 MM HG: CPT | Performed by: OTHER

## 2021-04-28 PROCEDURE — 99214 OFFICE O/P EST MOD 30 MIN: CPT | Performed by: OTHER

## 2021-04-28 PROCEDURE — 3078F DIAST BP <80 MM HG: CPT | Performed by: OTHER

## 2021-04-28 NOTE — PROGRESS NOTES
[facility]  [address] [facility], [address] [place] [address]  [phone]          Hospital Discharge Follow Up Visit       Date: April 28, 2021  Patient Name: Tati Celeste   MRN: [id number]  Primary physician: Juan Cam nose-finger intact bilaterally  Gait:  normal-based.     LABS/DATA:  N/A      IMAGING:  N/A    ASSESSMENT:  The patient is a 76 year old man with no past medical history presented with transient right hemibody paresthesias.  His neurological examination is including chart review, time spent with patient doing face to face evaluation w/ interview and exam and patient education, counseling, and time was spent in patient education, counseling, and coordination of care as described above.    Issues discussed: Tri

## 2021-04-28 NOTE — PATIENT INSTRUCTIONS
-Continue Aspirin, Lipitor and Lisinopril  -Outpatient heart monitoring   -Follow up in 6 months or sooner if needed.     -If you develop sudden onset loss of vision, double vision, room spinning/world spinning sensation, inability to speak or understand ot

## 2021-05-05 NOTE — PROGRESS NOTES
Looks like pt had CVA and was hospitalized in March, April (see encounter in chart review) .   He should probably come in for a skin check in the next 1-2 mos first

## 2021-05-08 RX ORDER — CLOPIDOGREL BISULFATE 75 MG/1
TABLET ORAL
Qty: 30 TABLET | Refills: 0 | Status: SHIPPED | OUTPATIENT
Start: 2021-05-08 | End: 2021-06-04

## 2021-05-08 RX ORDER — ATORVASTATIN CALCIUM 40 MG/1
TABLET, FILM COATED ORAL
Qty: 30 TABLET | Refills: 0 | Status: SHIPPED | OUTPATIENT
Start: 2021-05-08 | End: 2021-06-04

## 2021-06-03 ENCOUNTER — TELEPHONE (OUTPATIENT)
Dept: CASE MANAGEMENT | Age: 71
End: 2021-06-03

## 2021-06-04 RX ORDER — CLOPIDOGREL BISULFATE 75 MG/1
TABLET ORAL
Qty: 30 TABLET | Refills: 0 | Status: SHIPPED | OUTPATIENT
Start: 2021-06-04 | End: 2021-08-26

## 2021-06-04 RX ORDER — ATORVASTATIN CALCIUM 40 MG/1
TABLET, FILM COATED ORAL
Qty: 30 TABLET | Refills: 0 | Status: SHIPPED | OUTPATIENT
Start: 2021-06-04 | End: 2021-08-28 | Stop reason: DRUGHIGH

## 2021-08-26 ENCOUNTER — OFFICE VISIT (OUTPATIENT)
Dept: INTERNAL MEDICINE CLINIC | Facility: CLINIC | Age: 71
End: 2021-08-26
Payer: MEDICARE

## 2021-08-26 VITALS
TEMPERATURE: 99 F | DIASTOLIC BLOOD PRESSURE: 89 MMHG | SYSTOLIC BLOOD PRESSURE: 139 MMHG | HEIGHT: 69 IN | WEIGHT: 188 LBS | BODY MASS INDEX: 27.85 KG/M2

## 2021-08-26 DIAGNOSIS — E78.2 MIXED HYPERLIPIDEMIA: ICD-10-CM

## 2021-08-26 DIAGNOSIS — M62.838 MUSCLE SPASM OF LEFT LOWER EXTREMITY: Primary | ICD-10-CM

## 2021-08-26 DIAGNOSIS — Z00.00 HEALTH CARE MAINTENANCE: ICD-10-CM

## 2021-08-26 DIAGNOSIS — Z86.73 HISTORY OF TIA (TRANSIENT ISCHEMIC ATTACK): ICD-10-CM

## 2021-08-26 DIAGNOSIS — I10 ESSENTIAL HYPERTENSION: ICD-10-CM

## 2021-08-26 DIAGNOSIS — R73.03 PREDIABETES: ICD-10-CM

## 2021-08-26 DIAGNOSIS — E55.9 VITAMIN D DEFICIENCY: ICD-10-CM

## 2021-08-26 PROCEDURE — 3008F BODY MASS INDEX DOCD: CPT | Performed by: INTERNAL MEDICINE

## 2021-08-26 PROCEDURE — 99215 OFFICE O/P EST HI 40 MIN: CPT | Performed by: INTERNAL MEDICINE

## 2021-08-26 PROCEDURE — 3075F SYST BP GE 130 - 139MM HG: CPT | Performed by: INTERNAL MEDICINE

## 2021-08-26 PROCEDURE — 3079F DIAST BP 80-89 MM HG: CPT | Performed by: INTERNAL MEDICINE

## 2021-08-26 NOTE — PROGRESS NOTES
HPI:    Patient ID: Stuart Matos is a 78year old male. HPI       Patient has HTN, HLT, Prediabetes,diastolic dysfunction ,TIA. chronic caudate lacunar infarct. He is accompanied by wife.  He reports an episode  left lower leg lower leg pain when get swelling. Gastrointestinal: Negative for abdominal pain. Genitourinary: Negative for dysuria. Musculoskeletal: Negative for gait problem. Skin: Negative for rash. Neurological: Negative for dizziness, weakness and headaches.    Hematological: Nega lower leg: No edema. Comments: Both hips/knees have FROM. No calf tenderness    Lymphadenopathy:      Cervical: No cervical adenopathy. Skin:     Findings: No rash. Neurological:      General: No focal deficit present.       Mental Status: He is al [622][Q]      F/u in 4 weeks     Imaging & Referrals:  MRI BRAIN (CPT=70551)        45 mins was spent on reviewing old records, labs, medications, radiology tests > 50 %  face to face encounter, counseling , discussing treatments , answering all questions,

## 2021-08-28 RX ORDER — ATORVASTATIN CALCIUM 80 MG/1
80 TABLET, FILM COATED ORAL NIGHTLY
Qty: 90 TABLET | Refills: 3 | Status: SHIPPED | OUTPATIENT
Start: 2021-08-28

## 2021-08-28 RX ORDER — ATORVASTATIN CALCIUM 80 MG/1
80 TABLET, FILM COATED ORAL NIGHTLY
COMMUNITY
End: 2021-08-28

## 2021-08-28 RX ORDER — ASPIRIN 81 MG/1
81 TABLET ORAL DAILY
Qty: 90 TABLET | Refills: 3 | Status: SHIPPED | OUTPATIENT
Start: 2021-08-28

## 2021-08-28 RX ORDER — ASPIRIN 81 MG/1
81 TABLET ORAL DAILY
COMMUNITY
End: 2021-08-28

## 2021-08-28 RX ORDER — LISINOPRIL 20 MG/1
20 TABLET ORAL DAILY
COMMUNITY
End: 2021-08-28

## 2021-08-28 RX ORDER — LISINOPRIL 20 MG/1
20 TABLET ORAL DAILY
Qty: 90 TABLET | Refills: 3 | Status: SHIPPED | OUTPATIENT
Start: 2021-08-28

## 2021-08-28 NOTE — PATIENT INSTRUCTIONS
Eating Heart-Healthy Foods  Eating has a big impact on your heart health. In fact, eating healthier can improve several of your heart risks at once. For instance, it helps you manage weight, cholesterol, and blood pressure.  Here are ideas to help you demetrice foods and getting regular exercise. To help you track your progress, keep a diary to record what you eat and how often you exercise. Choose the right foods  Aim to make these foods staples of your diet.  If you have diabetes, you may have different recomme down on added fat, sugar and salt. Look on the internet for lower-fat, lower-sodium recipes without a lot of added sugars. Also try these tips:   · Remove fat from meat and skin from poultry before cooking. · Skim fat from the surface of soups and sauces.

## 2021-09-02 ENCOUNTER — NURSE ONLY (OUTPATIENT)
Dept: INTERNAL MEDICINE CLINIC | Facility: CLINIC | Age: 71
End: 2021-09-02
Payer: MEDICARE

## 2021-09-02 NOTE — PROGRESS NOTES
Ibrahima PARTIDA,TSH,LIPID,A1C,CMP,and CBC labs drawn per Dr Magdy Agee orders.  Patient tolerated lab draw well

## 2021-09-03 LAB
ABSOLUTE BASOPHILS: 61 CELLS/UL (ref 0–200)
ABSOLUTE EOSINOPHILS: 39 CELLS/UL (ref 15–500)
ABSOLUTE LYMPHOCYTES: 1249 CELLS/UL (ref 850–3900)
ABSOLUTE MONOCYTES: 869 CELLS/UL (ref 200–950)
ABSOLUTE NEUTROPHILS: 3284 CELLS/UL (ref 1500–7800)
ALBUMIN/GLOBULIN RATIO: 1.8 (CALC) (ref 1–2.5)
ALBUMIN: 4.3 G/DL (ref 3.6–5.1)
ALKALINE PHOSPHATASE: 75 U/L (ref 35–144)
ALT: 50 U/L (ref 9–46)
AST: 30 U/L (ref 10–35)
BASOPHILS: 1.1 %
BILIRUBIN, TOTAL: 1 MG/DL (ref 0.2–1.2)
BUN: 16 MG/DL (ref 7–25)
CALCIUM: 9.4 MG/DL (ref 8.6–10.3)
CARBON DIOXIDE: 29 MMOL/L (ref 20–32)
CHLORIDE: 100 MMOL/L (ref 98–110)
CHOL/HDLC RATIO: 3.9 (CALC)
CHOLESTEROL, TOTAL: 201 MG/DL
CREATININE: 1.05 MG/DL (ref 0.7–1.25)
EGFR IF AFRICN AM: 84 ML/MIN/1.73M2
EGFR IF NONAFRICN AM: 73 ML/MIN/1.73M2
EOSINOPHILS: 0.7 %
FOLATE, SERUM: >24 NG/ML
GLOBULIN: 2.4 G/DL (CALC) (ref 1.9–3.7)
GLUCOSE: 123 MG/DL (ref 65–99)
HDL CHOLESTEROL: 51 MG/DL
HEMATOCRIT: 42.2 % (ref 38.5–50)
HEMOGLOBIN A1C: 6.2 % OF TOTAL HGB
HEMOGLOBIN: 14.5 G/DL (ref 13.2–17.1)
LDL-CHOLESTEROL: 125 MG/DL (CALC)
LYMPHOCYTES: 22.7 %
MAGNESIUM: 2.2 MG/DL (ref 1.5–2.5)
MCH: 30.1 PG (ref 27–33)
MCHC: 34.4 G/DL (ref 32–36)
MCV: 87.7 FL (ref 80–100)
MONOCYTES: 15.8 %
MPV: 10.2 FL (ref 7.5–12.5)
NEUTROPHILS: 59.7 %
NON-HDL CHOLESTEROL: 150 MG/DL (CALC)
PLATELET COUNT: 161 THOUSAND/UL (ref 140–400)
POTASSIUM: 4.1 MMOL/L (ref 3.5–5.3)
PROTEIN, TOTAL: 6.7 G/DL (ref 6.1–8.1)
RDW: 12.8 % (ref 11–15)
RED BLOOD CELL COUNT: 4.81 MILLION/UL (ref 4.2–5.8)
SODIUM: 138 MMOL/L (ref 135–146)
TRIGLYCERIDES: 140 MG/DL
TSH W/REFLEX TO FT4: 0.69 MIU/L (ref 0.4–4.5)
VITAMIN B12: 297 PG/ML (ref 200–1100)
VITAMIN D, 25-OH, TOTAL: 33 NG/ML (ref 30–100)
WHITE BLOOD CELL COUNT: 5.5 THOUSAND/UL (ref 3.8–10.8)

## 2021-09-10 NOTE — PROGRESS NOTES
Gisell Conner, still no follow up from [facility], despite multiple msgs

## 2021-09-13 ENCOUNTER — TELEPHONE (OUTPATIENT)
Dept: INTERNAL MEDICINE CLINIC | Facility: CLINIC | Age: 71
End: 2021-09-13

## 2021-10-05 ENCOUNTER — TELEPHONE (OUTPATIENT)
Dept: INTERNAL MEDICINE CLINIC | Facility: CLINIC | Age: 71
End: 2021-10-05

## 2021-10-05 NOTE — TELEPHONE ENCOUNTER
Spoke to the patient regarding lab results, patient states this is all wrong and would like to speak with Dr. Diana Field because the lab results are incorrect.

## 2021-10-05 NOTE — TELEPHONE ENCOUNTER
Spoke with patient to schedule MA supervisit and would like a call back tomorrow as he is currently at work in order to set up that apt.

## 2021-10-05 NOTE — TELEPHONE ENCOUNTER
Patient states never received a missed call or a voicemail and would like a call to discuss results. Please advise.

## 2021-12-22 ENCOUNTER — TELEPHONE (OUTPATIENT)
Dept: INTERNAL MEDICINE CLINIC | Facility: CLINIC | Age: 71
End: 2021-12-22

## 2021-12-22 NOTE — TELEPHONE ENCOUNTER
Patient informed is eligible for 2022 Medicare Advantage Supervisit, states will call back to schedule.

## 2022-04-04 RX ORDER — LISINOPRIL 20 MG/1
TABLET ORAL
Qty: 90 TABLET | Refills: 2 | OUTPATIENT
Start: 2022-04-04

## 2022-05-06 ENCOUNTER — TELEPHONE (OUTPATIENT)
Dept: INTERNAL MEDICINE CLINIC | Facility: CLINIC | Age: 72
End: 2022-05-06

## 2022-05-06 NOTE — TELEPHONE ENCOUNTER
Spoke with pt, reminding him that is time for his annual physical. Pt will call back to sched an appt with one of our providers

## 2022-07-26 ENCOUNTER — TELEPHONE (OUTPATIENT)
Dept: INTERNAL MEDICINE CLINIC | Facility: CLINIC | Age: 72
End: 2022-07-26

## 2022-07-26 NOTE — TELEPHONE ENCOUNTER
L/m to pt letting him know that is time for his annual physical  To contact our office to sched an appt

## 2022-08-10 ENCOUNTER — TELEPHONE (OUTPATIENT)
Dept: INTERNAL MEDICINE CLINIC | Facility: CLINIC | Age: 72
End: 2022-08-10

## 2022-08-10 RX ORDER — LISINOPRIL 20 MG/1
20 TABLET ORAL DAILY
Qty: 30 TABLET | Refills: 0 | Status: SHIPPED | OUTPATIENT
Start: 2022-08-10

## 2022-08-10 NOTE — TELEPHONE ENCOUNTER
Wife came in requesting refills for lisinopril 20 MG Oral Tab  Wife is aware that pt needs to establish care with a provider. Wife will be searching information in regards our providers since she wants to know where did providers graduated, what other degrees they have, what other titles they have and their reviews  After that she will determine which provider is best for pt. Wife stated that pt will be going out of town at the end of the month and will need this medication asap.  To please inform her when medication has been sent

## 2022-08-16 ENCOUNTER — OFFICE VISIT (OUTPATIENT)
Dept: INTERNAL MEDICINE CLINIC | Facility: CLINIC | Age: 72
End: 2022-08-16
Payer: MEDICARE

## 2022-08-16 VITALS
HEIGHT: 69 IN | DIASTOLIC BLOOD PRESSURE: 79 MMHG | SYSTOLIC BLOOD PRESSURE: 128 MMHG | HEART RATE: 61 BPM | RESPIRATION RATE: 16 BRPM | WEIGHT: 189.19 LBS | BODY MASS INDEX: 28.02 KG/M2

## 2022-08-16 DIAGNOSIS — Z00.00 ANNUAL PHYSICAL EXAM: Primary | ICD-10-CM

## 2022-08-16 DIAGNOSIS — I10 ESSENTIAL HYPERTENSION: ICD-10-CM

## 2022-08-16 PROCEDURE — 3078F DIAST BP <80 MM HG: CPT | Performed by: NURSE PRACTITIONER

## 2022-08-16 PROCEDURE — 1126F AMNT PAIN NOTED NONE PRSNT: CPT | Performed by: NURSE PRACTITIONER

## 2022-08-16 PROCEDURE — 3008F BODY MASS INDEX DOCD: CPT | Performed by: NURSE PRACTITIONER

## 2022-08-16 PROCEDURE — 3074F SYST BP LT 130 MM HG: CPT | Performed by: NURSE PRACTITIONER

## 2022-08-16 PROCEDURE — 99214 OFFICE O/P EST MOD 30 MIN: CPT | Performed by: NURSE PRACTITIONER

## 2022-08-16 RX ORDER — LISINOPRIL 20 MG/1
20 TABLET ORAL DAILY
Qty: 30 TABLET | Refills: 0 | Status: SHIPPED | OUTPATIENT
Start: 2022-08-16

## 2022-08-16 RX ORDER — ATORVASTATIN CALCIUM 80 MG/1
80 TABLET, FILM COATED ORAL NIGHTLY
Qty: 90 TABLET | Refills: 3 | Status: SHIPPED | OUTPATIENT
Start: 2022-08-16 | End: 2022-08-16 | Stop reason: CLARIF

## 2022-08-16 NOTE — ASSESSMENT & PLAN NOTE
Patient with hx of Hyperlipidemia. He has not been taking a statin. Would like to see his lab work and schedule a physical to determine treatment. Plan  Annual lab ordered. Will schedule physical when he returns from [facility].

## 2022-08-16 NOTE — ASSESSMENT & PLAN NOTE
Blood pressure 128/79, pulse 61, resp. rate 16, height 5' 9\" (1.753 m), weight 189 lb 3.2 oz (85.8 kg).   Stable B/P    Plan  Continue lisinopril 20mg po daily  Continue Aspirin 81 mg po dailly

## 2022-09-12 RX ORDER — LISINOPRIL 20 MG/1
20 TABLET ORAL DAILY
Qty: 90 TABLET | Refills: 1 | Status: SHIPPED | OUTPATIENT
Start: 2022-09-12

## 2022-09-12 NOTE — TELEPHONE ENCOUNTER
Protocol failed or has No Protocol, please review  Requested Prescriptions   Pending Prescriptions Disp Refills    LISINOPRIL 20 MG Oral Tab [Pharmacy Med Name: LISINOPRIL 20MG TABLET] 30 tablet 0     Sig: Take 1 tablet (20 mg total) by mouth daily. Partial refills only until patient finds a new pcp. Hypertensive Medications Protocol Failed - 9/12/2022  9:26 AM        Failed - CMP or BMP in past 6 months     No results found for this or any previous visit (from the past 4392 hour(s)).               Failed - GFR > 50     No results found for: [facility]              Passed - In person appointment in the past 12 or next 3 months       Recent Outpatient Visits              3 weeks ago Annual physical exam    [facility] [place], [address], [facility], [facility]    Office Visit    1 year ago     Gary Herrera    Nurse Only    1 year ago Muscle spasm of left lower extremity    Aura Moctezuma MD    Office Visit    1 year ago TIA (transient ischemic attack)    Sasha Lei, [place]    Office Visit    1 year ago TIA (transient ischemic attack)    Darwin Herrera Babetta Gower, MD    Office Visit                 Passed - Last BP reading less than 140/90     BP Readings from Last 1 Encounters:  08/16/22 : 128/79                Passed - In person appointment or virtual visit in the past 6 months       Recent Outpatient Visits              3 weeks ago Annual physical exam    [address] [place], [address], [facility], APRN    Office Visit    1 year ago     Ayan Herrera    Nurse Only    1 year ago Muscle spasm of left lower extremity    Aura Moctezuma MD    Office Visit    1 year ago TIA (transient ischemic attack)    Ghazal Gupta Yi Centeno DO    Office Visit    1 year ago TIA (transient ischemic attack)    Samuel Arriola MD    Office Visit                       Recent Outpatient Visits              3 weeks ago Annual physical exam    [address] [place] [address]    Office Visit    1 year ago     Ayan Herrera    Nurse Only    1 year ago Muscle spasm of left lower extremity    [employer], Jane Patel MD    Office Visit    1 year ago TIA (transient ischemic attack)    Gilbert Lei [place], [place]    Office Visit    1 year ago TIA (transient ischemic attack)    [employer], Jane Patel MD    Office Visit

## 2023-02-02 ENCOUNTER — TELEPHONE (OUTPATIENT)
Dept: INTERNAL MEDICINE CLINIC | Facility: CLINIC | Age: 73
End: 2023-02-02

## 2023-02-08 ENCOUNTER — TELEPHONE (OUTPATIENT)
Dept: INTERNAL MEDICINE CLINIC | Facility: CLINIC | Age: 73
End: 2023-02-08

## 2023-02-08 NOTE — TELEPHONE ENCOUNTER
Spoke to patient in regards to establishing care with Dr. Shantelle Gonzalez.  Patient confirmed that he is seeing another provider Dr. Tray Torrez      Verified insurance through [facility] and pcp has been changed on insurance as well

## 2023-03-06 ENCOUNTER — PATIENT OUTREACH (OUTPATIENT)
Dept: CASE MANAGEMENT | Age: 73
End: 2023-03-06

## 2023-03-06 NOTE — PROCEDURES
The office order for PCP removal request is Approved and finalized on March 6, 2023.     Thanks,  [facility] [employer]

## 2023-07-01 ENCOUNTER — APPOINTMENT (OUTPATIENT)
Dept: CT IMAGING | Facility: HOSPITAL | Age: 73
End: 2023-07-01
Attending: EMERGENCY MEDICINE
Payer: MEDICARE

## 2023-07-01 ENCOUNTER — HOSPITAL ENCOUNTER (OUTPATIENT)
Facility: HOSPITAL | Age: 73
Setting detail: OBSERVATION
Discharge: LEFT BEFORE DISCHARGE | End: 2023-07-02
Attending: EMERGENCY MEDICINE
Payer: MEDICARE

## 2023-07-01 ENCOUNTER — HOSPITAL ENCOUNTER (INPATIENT)
Facility: HOSPITAL | Age: 73
LOS: 1 days | Discharge: LEFT BEFORE DISCHARGE | End: 2023-07-02
Attending: EMERGENCY MEDICINE
Payer: MEDICARE

## 2023-07-01 DIAGNOSIS — I16.1 HYPERTENSIVE EMERGENCY: ICD-10-CM

## 2023-07-01 DIAGNOSIS — G45.9 TRANSIENT ISCHEMIC ATTACK (TIA): Primary | ICD-10-CM

## 2023-07-01 PROBLEM — R73.9 HYPERGLYCEMIA: Status: ACTIVE | Noted: 2023-07-01

## 2023-07-01 LAB
ALBUMIN SERPL-MCNC: 4.1 G/DL (ref 3.4–5)
ALBUMIN/GLOB SERPL: 1.3 {RATIO} (ref 1–2)
ALP LIVER SERPL-CCNC: 59 U/L
ALT SERPL-CCNC: 86 U/L
ANION GAP SERPL CALC-SCNC: 9 MMOL/L (ref 0–18)
AST SERPL-CCNC: 43 U/L (ref 15–37)
BASOPHILS # BLD AUTO: 0.06 X10(3) UL (ref 0–0.2)
BASOPHILS NFR BLD AUTO: 0.9 %
BILIRUB SERPL-MCNC: 0.5 MG/DL (ref 0.1–2)
BUN BLD-MCNC: 17 MG/DL (ref 7–18)
BUN/CREAT SERPL: 13.2 (ref 10–20)
CALCIUM BLD-MCNC: 9.3 MG/DL (ref 8.5–10.1)
CHLORIDE SERPL-SCNC: 108 MMOL/L (ref 98–112)
CO2 SERPL-SCNC: 25 MMOL/L (ref 21–32)
CREAT BLD-MCNC: 1.29 MG/DL
DEPRECATED RDW RBC AUTO: 41 FL (ref 35.1–46.3)
EOSINOPHIL # BLD AUTO: 0.16 X10(3) UL (ref 0–0.7)
EOSINOPHIL NFR BLD AUTO: 2.3 %
ERYTHROCYTE [DISTWIDTH] IN BLOOD BY AUTOMATED COUNT: 12.6 % (ref 11–15)
GFR SERPLBLD BASED ON 1.73 SQ M-ARVRAT: 60 ML/MIN/1.73M2 (ref 60–?)
GLOBULIN PLAS-MCNC: 3.1 G/DL (ref 2.8–4.4)
GLUCOSE BLD-MCNC: 179 MG/DL (ref 70–99)
GLUCOSE BLDC GLUCOMTR-MCNC: 136 MG/DL (ref 70–99)
HCT VFR BLD AUTO: 44.2 %
HGB BLD-MCNC: 15.3 G/DL
IMM GRANULOCYTES # BLD AUTO: 0.03 X10(3) UL (ref 0–1)
IMM GRANULOCYTES NFR BLD: 0.4 %
LYMPHOCYTES # BLD AUTO: 2.4 X10(3) UL (ref 1–4)
LYMPHOCYTES NFR BLD AUTO: 34.2 %
MCH RBC QN AUTO: 30.7 PG (ref 26–34)
MCHC RBC AUTO-ENTMCNC: 34.6 G/DL (ref 31–37)
MCV RBC AUTO: 88.8 FL
MONOCYTES # BLD AUTO: 0.62 X10(3) UL (ref 0.1–1)
MONOCYTES NFR BLD AUTO: 8.8 %
NEUTROPHILS # BLD AUTO: 3.75 X10 (3) UL (ref 1.5–7.7)
NEUTROPHILS # BLD AUTO: 3.75 X10(3) UL (ref 1.5–7.7)
NEUTROPHILS NFR BLD AUTO: 53.4 %
OSMOLALITY SERPL CALC.SUM OF ELEC: 300 MOSM/KG (ref 275–295)
PLATELET # BLD AUTO: 213 10(3)UL (ref 150–450)
POTASSIUM SERPL-SCNC: 4.1 MMOL/L (ref 3.5–5.1)
PROT SERPL-MCNC: 7.2 G/DL (ref 6.4–8.2)
RBC # BLD AUTO: 4.98 X10(6)UL
SODIUM SERPL-SCNC: 142 MMOL/L (ref 136–145)
WBC # BLD AUTO: 7 X10(3) UL (ref 4–11)

## 2023-07-01 PROCEDURE — 70450 CT HEAD/BRAIN W/O DYE: CPT | Performed by: EMERGENCY MEDICINE

## 2023-07-01 RX ORDER — ASPIRIN 325 MG
325 TABLET ORAL DAILY
Status: DISCONTINUED | OUTPATIENT
Start: 2023-07-02 | End: 2023-07-02

## 2023-07-01 RX ORDER — ASPIRIN 300 MG/1
300 SUPPOSITORY RECTAL DAILY
Status: DISCONTINUED | OUTPATIENT
Start: 2023-07-02 | End: 2023-07-02

## 2023-07-01 RX ORDER — ACETAMINOPHEN 650 MG/1
650 SUPPOSITORY RECTAL EVERY 4 HOURS PRN
Status: DISCONTINUED | OUTPATIENT
Start: 2023-07-01 | End: 2023-07-02

## 2023-07-01 RX ORDER — ENOXAPARIN SODIUM 100 MG/ML
40 INJECTION SUBCUTANEOUS DAILY
Status: DISCONTINUED | OUTPATIENT
Start: 2023-07-02 | End: 2023-07-02

## 2023-07-01 RX ORDER — ONDANSETRON 2 MG/ML
4 INJECTION INTRAMUSCULAR; INTRAVENOUS EVERY 6 HOURS PRN
Status: DISCONTINUED | OUTPATIENT
Start: 2023-07-01 | End: 2023-07-02

## 2023-07-01 RX ORDER — HYDRALAZINE HYDROCHLORIDE 20 MG/ML
10 INJECTION INTRAMUSCULAR; INTRAVENOUS ONCE
Status: COMPLETED | OUTPATIENT
Start: 2023-07-01 | End: 2023-07-01

## 2023-07-01 RX ORDER — CLOPIDOGREL BISULFATE 75 MG/1
300 TABLET ORAL ONCE
Status: COMPLETED | OUTPATIENT
Start: 2023-07-01 | End: 2023-07-01

## 2023-07-01 RX ORDER — METOCLOPRAMIDE HYDROCHLORIDE 5 MG/ML
10 INJECTION INTRAMUSCULAR; INTRAVENOUS EVERY 8 HOURS PRN
Status: DISCONTINUED | OUTPATIENT
Start: 2023-07-01 | End: 2023-07-02

## 2023-07-01 RX ORDER — HYDRALAZINE HYDROCHLORIDE 20 MG/ML
10 INJECTION INTRAMUSCULAR; INTRAVENOUS EVERY 2 HOUR PRN
Status: DISCONTINUED | OUTPATIENT
Start: 2023-07-01 | End: 2023-07-02

## 2023-07-01 RX ORDER — LABETALOL HYDROCHLORIDE 5 MG/ML
20 INJECTION, SOLUTION INTRAVENOUS ONCE
Status: COMPLETED | OUTPATIENT
Start: 2023-07-01 | End: 2023-07-01

## 2023-07-01 RX ORDER — LABETALOL HYDROCHLORIDE 5 MG/ML
10 INJECTION, SOLUTION INTRAVENOUS EVERY 10 MIN PRN
Status: DISCONTINUED | OUTPATIENT
Start: 2023-07-01 | End: 2023-07-02

## 2023-07-01 RX ORDER — ATORVASTATIN CALCIUM 40 MG/1
40 TABLET, FILM COATED ORAL NIGHTLY
Status: DISCONTINUED | OUTPATIENT
Start: 2023-07-01 | End: 2023-07-02

## 2023-07-01 RX ORDER — ACETAMINOPHEN 325 MG/1
650 TABLET ORAL EVERY 4 HOURS PRN
Status: DISCONTINUED | OUTPATIENT
Start: 2023-07-01 | End: 2023-07-02

## 2023-07-01 NOTE — ED QUICK NOTES
Carley Fuentes arrived through triage with his Wife for c/o stroke-like symptoms from approx 9am-1pm today. States he awoke at approx 9am with numbness/tingling to the R sided corner of his mouth. States his speech was slurred and his handwriting was altered. He took an 81mg ASA shortly after symptoms resolved at approx 1pm.     On exam in ED, Pt has no complaints. No focal neuro or motor deficits noted. Will continue to monitor.

## 2023-07-02 ENCOUNTER — APPOINTMENT (OUTPATIENT)
Dept: CT IMAGING | Facility: HOSPITAL | Age: 73
End: 2023-07-02
Attending: STUDENT IN AN ORGANIZED HEALTH CARE EDUCATION/TRAINING PROGRAM
Payer: MEDICARE

## 2023-07-02 ENCOUNTER — APPOINTMENT (OUTPATIENT)
Dept: MRI IMAGING | Facility: HOSPITAL | Age: 73
End: 2023-07-02
Attending: INTERNAL MEDICINE
Payer: MEDICARE

## 2023-07-02 VITALS
WEIGHT: 188.69 LBS | HEART RATE: 83 BPM | TEMPERATURE: 98 F | HEIGHT: 69 IN | BODY MASS INDEX: 27.95 KG/M2 | SYSTOLIC BLOOD PRESSURE: 177 MMHG | OXYGEN SATURATION: 96 % | DIASTOLIC BLOOD PRESSURE: 107 MMHG | RESPIRATION RATE: 18 BRPM

## 2023-07-02 PROBLEM — R20.0 FACIAL NUMBNESS: Status: ACTIVE | Noted: 2023-07-02

## 2023-07-02 PROBLEM — I63.9 ACUTE CVA (CEREBROVASCULAR ACCIDENT) (HCC): Status: ACTIVE | Noted: 2023-07-02

## 2023-07-02 PROBLEM — R47.9 DIFFICULTY WITH SPEECH: Status: ACTIVE | Noted: 2023-07-02

## 2023-07-02 PROBLEM — I10 PRIMARY HYPERTENSION: Status: ACTIVE | Noted: 2020-08-04

## 2023-07-02 LAB
CHOLEST SERPL-MCNC: 195 MG/DL (ref ?–200)
EST. AVERAGE GLUCOSE BLD GHB EST-MCNC: 140 MG/DL (ref 68–126)
GLUCOSE BLDC GLUCOMTR-MCNC: 193 MG/DL (ref 70–99)
HBA1C MFR BLD: 6.5 % (ref ?–5.7)
HDLC SERPL-MCNC: 49 MG/DL (ref 40–59)
LDLC SERPL CALC-MCNC: 122 MG/DL (ref ?–100)
NONHDLC SERPL-MCNC: 146 MG/DL (ref ?–130)
TRIGL SERPL-MCNC: 134 MG/DL (ref 30–149)
VLDLC SERPL CALC-MCNC: 24 MG/DL (ref 0–30)

## 2023-07-02 PROCEDURE — 70551 MRI BRAIN STEM W/O DYE: CPT | Performed by: INTERNAL MEDICINE

## 2023-07-02 PROCEDURE — 99223 1ST HOSP IP/OBS HIGH 75: CPT | Performed by: STUDENT IN AN ORGANIZED HEALTH CARE EDUCATION/TRAINING PROGRAM

## 2023-07-02 RX ORDER — ATORVASTATIN CALCIUM 80 MG/1
80 TABLET, FILM COATED ORAL NIGHTLY
Qty: 30 TABLET | Refills: 3 | Status: SHIPPED | OUTPATIENT
Start: 2023-07-02

## 2023-07-02 RX ORDER — CLOPIDOGREL BISULFATE 75 MG/1
75 TABLET ORAL DAILY
Qty: 20 TABLET | Refills: 0 | Status: SHIPPED | OUTPATIENT
Start: 2023-07-02

## 2023-07-02 RX ORDER — CLOPIDOGREL BISULFATE 75 MG/1
75 TABLET ORAL DAILY
Status: DISCONTINUED | OUTPATIENT
Start: 2023-07-02 | End: 2023-07-02

## 2023-07-02 RX ORDER — LISINOPRIL 20 MG/1
30 TABLET ORAL DAILY
Qty: 45 TABLET | Refills: 1 | Status: SHIPPED | OUTPATIENT
Start: 2023-07-02

## 2023-07-02 RX ORDER — ATORVASTATIN CALCIUM 80 MG/1
80 TABLET, FILM COATED ORAL NIGHTLY
Status: DISCONTINUED | OUTPATIENT
Start: 2023-07-02 | End: 2023-07-02

## 2023-07-02 NOTE — ED QUICK NOTES
Rounding Completed    MD at bedside. Plan of Care reviewed. Patient is in agreement with admission at this time. Waiting for transport to the floor. Elimination needs assessed. Provided urinal for when needed. Patient educated to not get up without staff in room and shown how to utilize call light. Pt verbalized understanding to all information. Pt denying any other needs at this time. Bed is locked and in lowest position. Call light within reach.

## 2023-07-02 NOTE — PROGRESS NOTES
Risks/benefits/alternatives discussed with patient as applicable to reason for leaving AMA? Yes  Provider(s) contacted: Dr. Jessica Puckett  Patient education/AVS/follow-up appointments/prescriptions given? no  AMA paperwork completed? yes  Removed IV access/decannulated port if applicable and patient belongings returned.     Documented from Admission Navigator:  Belongings at Bedside: Vision (cellphone)  Vision - Corrective Lenses: Glasses  Belongings Sent to Public Safety: None  Medications brought by patient?: No

## 2023-07-02 NOTE — ED QUICK NOTES
Orders for admission, patient is aware of plan and ready to go upstairs. Any questions, please call ED RN Christina Fu RN at extension [phone].      Patient Covid vaccination status: Unvaccinated     COVID Test Ordered in ED: None    COVID Suspicion at Admission: N/A    Running Infusions:  None    Mental Status/LOC at time of transport: AAOx3    Other pertinent information:   CIWA score: N/A   NIH score:  0

## 2023-07-02 NOTE — ED QUICK NOTES
Patient transferred to admission room via transporter in stable condition. Chana ROBIN called and updated on patient and to give ordered Plavix tablet when patient gets upstairs.

## 2023-07-02 NOTE — SLP NOTE
ADULT SWALLOWING EVALUATION    ASSESSMENT    ASSESSMENT/OVERALL IMPRESSION:  PPE REQUIRED. THIS THERAPIST WORE GLOVES, DROPLET MASK, AND GOGGLES FOR DURATION OF EVALUATION. HANDS WASHED UPON ENTRANCE/EXIT. SLP BSSE orders received and acknowledged. A swallow evaluation warranted as pt admitted to the hospital with concern for TIA and possible stroke. Stroke protocol prompting this evaluation. Pt afebrile with clear vocal quality, 100% speech intelligibility, and tolerating room air with oxygen saturation rate of 99% prior to PO trials. Pt with hx of dysphagia at [address] [place] [address] last seen in 3/2021 for a clinical dysphagia evaluation recommending a regular diet with thin liquids and no skilled services required. Pt positioned upright at 90 degrees in bed. Oral motor exam revealed symmetric features with adequate ROM and speed of motion. Pt with adequate oral acceptance and bilabial seal across all trials. Pt with intact bite, mastication of solids, and timely A-P transit. No residue remaining in the oral cavity after the initial swallow. Pt's swallow response appears timely with adequate hyolaryngeal elevation/excursion palpated across trials and consistencies. There were no s/s of aspiration or respiratory compromise with 100% of the trials observed in this limited clinical exam.  Clear vocalizations assessed between trials. Oxygen status remained >97% t/o the entire evaluation. At this time, pt presents with [facility] oral and pharyngeal swallow skills, functional for a regular diet with thin liquids. Recommend strict adherence to safe swallowing compensatory strategies. Results and recommendations reviewed with RN and the patient. SLP collaborated with RN for MD diet orders. RECOMMENDATIONS   Diet Recommendations - Solids: Regular  Diet Recommendations - Liquids:  Thin Liquids        Aspiration Precautions: Upright position  Medication Administration Recommendations: No restrictions  Treatment Plan/Recommendations: No further inpatient SLP service warranted  Discharge Recommendations/Plan: Undetermined    HISTORY   MEDICAL HISTORY  Reason for Referral: R/O aspiration    Problem List  Principal Problem:    Transient ischemic attack (TIA)  Active Problems:    Hyperglycemia    Hypertensive emergency      Past Medical History  Past Medical History:   Diagnosis Date    Dysplastic nevus 2020    upper back - mod dysplastic nevus    Dysplastic nevus 2021    mod dysplasia - upper back    Essential hypertension     Left wrist dislocation 2014       Prior Living Situation: Home with support  Diet Prior to Admission: Regular; Thin liquids  Precautions: Aspiration    Patient/Family Goals: none stated    SWALLOWING HISTORY  Current Diet Consistency: Regular; Thin liquids  Dysphagia History: last seen at [facility] in 3/2021 with recommendations for a regular diet with thin liquids    Imaging Results:   CT of the brain completed on 7/1/23:  CONCLUSION:   No acute intracranial abnormality. Mild chronic microvascular white matter ischemia. SUBJECTIVE       OBJECTIVE   ORAL MOTOR EXAMINATION  Dentition: Functional;Natural  Symmetry: Within Functional Limits  Strength: Within Functional Limits     Range of Motion: Within Functional Limits  Rate of Motion: Within Functional Limits    Voice Quality: Clear  Respiratory Status: Unlabored  Consistencies Trialed: Thin liquids;Puree;Hard solid  Method of Presentation: Self presentation;Cup;Straw;Single sips; Consecutive swallows  Patient Positioning: Upright;Midline    Oral Phase of Swallow: Within Functional Limits     Pharyngeal Phase of Swallow: suspect grossly Within Functional Limits     (Please note: Silent aspiration cannot be evaluated clinically.  Videofluoroscopic Swallow Study is required to rule-out silent aspiration.)    Esophageal Phase of Swallow: No complaints consistent with possible esophageal involvement              FOLLOW UP  Treatment Plan/Recommendations: No further inpatient SLP service warranted  Number of Visits to Meet Established Goals: 0  Follow Up Needed (Documentation Required): No       Thank you for your referral.   If you have any questions, please contact DAVID Murphy

## 2023-07-02 NOTE — PHYSICAL THERAPY NOTE
PHYSICAL THERAPY EVALUATION - INPATIENT     Room Number: 340/340-A  Evaluation Date: 7/2/2023  Type of Evaluation: Initial   Physician Order: PT Eval and Treat    Presenting Problem: TIA  Co-Morbidities : Hypertension  Reason for Therapy: Mobility Dysfunction and Discharge Planning    PHYSICAL THERAPY ASSESSMENT     Patient is a 81year old male admitted 7/1/2023 for TIA. Pt currently demonstrates no functional mobility deficits requiring skill of IP PT. He is presenting at his baseline of independent. He was able to ambulate in nye, up/down 1 flight of stairs and complete all functional transfers independently without an assistive device. His strength, coordination and balance are WFL. He will be discharged from IP PT services at this time. He was noted to have significant hypertension following activity - RN Was notified and aware that pt does have permissible hypertension. Pt ended session in bed with all needs in reach. The patient's Approx Degree of Impairment: 0% has been calculated based on documentation in the [facility] '6 clicks' Inpatient Basic Mobility Short Form. Research supports that patients with this level of impairment may benefit from DC home - no PT needs. DISCHARGE RECOMMENDATIONS  PT Discharge Recommendations: Home    PLAN    Patient has been evaluated and presents with no skilled Physical Therapy needs at this time. Patient will be discharged from Physical Therapy services. Please re-order if a new functional limitation presents during this admission.     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 SUBJECTIVE  I need to get out of here, I have lots of clients I need to meet with tomorrow. PHYSICAL THERAPY EXAMINATION     OBJECTIVE     Fall Risk: Standard fall risk    WEIGHT BEARING RESTRICTION                PAIN ASSESSMENT  Ratin          COGNITION  Overall Cognitive Status:  WFL - within functional limits    RANGE OF MOTION AND STRENGTH ASSESSMENT  Upper extremity ROM and strength are within functional limits   Lower extremity ROM is within functional limits   Lower extremity strength is within functional limits     BALANCE  Static Sitting: Good  Dynamic Sitting: Good  Static Standing: Good  Dynamic Standing: Good    ADDITIONAL TESTS                                    NEUROLOGICAL FINDINGS     Coordination - Heel to Shin: Symmetrical  Coordination - Rapid Alternating Movement: Symmetrical  Sensation: Lt touch intact maddison LEs          ACTIVITY TOLERANCE           BP: (!) 177/107 (Following ambulation/stairs)  BP Location: Right arm  BP Method: Automatic  Patient Position: Sitting    O2 WALK  Oxygen Therapy  SPO2% Ambulation on Oxygen: 97    AM-PAC '6-Clicks' INPATIENT SHORT FORM - BASIC MOBILITY  How much difficulty does the patient currently have. .. Patient Difficulty: Turning over in bed (including adjusting bedclothes, sheets and blankets)?: None   Patient Difficulty: Sitting down on and standing up from a chair with arms (e.g., wheelchair, bedside commode, etc.): None   Patient Difficulty: Moving from lying on back to sitting on the side of the bed?: None   How much help from another person does the patient currently need. ..    Help from Another: Moving to and from a bed to a chair (including a wheelchair)?: None   Help from Another: Need to walk in hospital room?: None   Help from Another: Climbing 3-5 steps with a railing?: None     AM-PAC Score:  Raw Score: 24   Approx Degree of Impairment: 0%   Standardized Score (AM-PAC Scale): 61.14   CMS Modifier (G-Code): CH    FUNCTIONAL ABILITY STATUS  Functional Mobility/Gait Assessment  Gait Assistance: Independent  Distance (ft): 300 ft  Assistive Device: None  Pattern: Within Functional Limits  Stairs: Stairs  How Many Stairs: 12  Device: None  Assist: Independent  Pattern: Ascend  Ascend: Reciprocal      Exercise/Education Provided:  Role of IP PT services, safety precautions. Patient End of Session: In bed;Needs met;Call light within reach;RN aware of session/findings; All patient questions and concerns addressed    Patient was able to achieve the following . ..    Patient able to transfer  At previous, functional level  Safely and independently    Patient able to ambulate on level surfaces  At previous, functional level  Safely and independently       Patient Evaluation Complexity Level:  History Moderate - 1 or 2 personal factors and/or co-morbidities   Examination of body systems Moderate - addressing a total of 3 or more elements   Clinical Presentation Low - Stable   Clinical Decision Making Low Complexity       PT LOW COMPLEX EVAL x12 mins

## 2023-07-02 NOTE — PLAN OF CARE
Problem: CARDIOVASCULAR - ADULT  Goal: Maintains optimal cardiac output and hemodynamic stability  Description: INTERVENTIONS:  - Monitor vital signs, rhythm, and trends  - Monitor for bleeding, hypotension and signs of decreased cardiac output  - Evaluate effectiveness of vasoactive medications to optimize hemodynamic stability  - Assess quality of pulses, skin color and temperature  - Assess for signs of decreased coronary artery perfusion - ex. Angina  - Evaluate fluid balance, assess for edema, trend weights  Outcome: Progressing  Goal: Absence of cardiac arrhythmias or at baseline  Description: INTERVENTIONS:  - Continuous cardiac monitoring, monitor vital signs, obtain 12 lead EKG if indicated  - Evaluate effectiveness of antiarrhythmic and heart rate control medications as ordered  - Initiate emergency measures for life threatening arrhythmias  - Monitor electrolytes and administer replacement therapy as ordered  Outcome: Progressing     Problem: NEUROLOGICAL - ADULT  Goal: Achieves stable or improved neurological status  Description: INTERVENTIONS  - Assess for and report changes in neurological status  - Initiate measures to prevent increased intracranial pressure  - Maintain blood pressure and fluid volume within ordered parameters to optimize cerebral perfusion and minimize risk of hemorrhage  - Monitor temperature, glucose, and sodium. Initiate appropriate interventions as ordered  Outcome: Progressing    Received awake,oriented. No neuro changes at this time. No complaints of pain. Plans for MRI brain,PT/OT/ST evaluation and ECHO today. Will continue to monitor patient.

## 2023-07-03 ENCOUNTER — TELEPHONE (OUTPATIENT)
Dept: NEUROLOGY | Facility: CLINIC | Age: 73
End: 2023-07-03

## 2023-07-03 DIAGNOSIS — G45.9 BRAIN TIA: Primary | ICD-10-CM

## 2023-07-03 NOTE — CDS QUERY
How to Answer this Query    1.) Click \"Edit Button\" on the toolbar  2.) Type an \"X\" in the bracket for the diagnosis that applies. (You may also add additional clinical details as you feel necessary to substantiate your response). 3.) Finally click \"Sign\" to complete response. Thank you     Clarification - Hypertension  CLINICAL DOCUMENTATION CLARIFICATION FORM     Dear Doctor Vinson Printers:    Clinical information (provided below) includes Hypertension requiring clarification. For accurate ICD-10-CM code assignment to reflect severity of illness and risk of mortality,   PLEASE (X) ALL DIAGNOSES THAT APPLY. SELECTION BY PROVIDER ONLY     Hypertension is documented in the medical record. Please specify the type of hypertension such as:                                ( x ) Crisis                (  ) Emergency                 (  ) Urgency                      (  ) Other (please specify in the medical record     Clinical Indicators:    ED Impression:  TIA, Hypertensive emergency  H&P  TIA, Hx of CVA,  New onset DM  Uncontrolled HTN  Neurology consult:   Patient presented with transient right facial numbness, slurred speech and difficulty with writing. Patient's symptoms were resolved on arrival in the ER  Possible differentials include stroke versus TIA versus hypertension related/hypertensive emergency  Uncontrolled hypertension-patient's blood pressure was 206/147 on presentation     Meds include labetolol IVP, ASA 325mg, lovenox       If you have any questions, please contact Clinical :  aLure Camacho RN CCDS  at [phone]   Thank You!      THIS FORM IS A PERMANENT PART OF THE MEDICAL RECORD             Revision History

## 2023-07-06 NOTE — ED INITIAL ASSESSMENT (HPI)
Pt states woke at 0900. States had numbness to rt corner of lips, speech was not clear, writing was not normal. A few hour ago symptoms are resolved. Pt does take lisinopril.
Yes

## 2023-07-12 ENCOUNTER — TELEPHONE (OUTPATIENT)
Dept: NEUROLOGY | Facility: CLINIC | Age: 73
End: 2023-07-12

## 2023-07-12 ENCOUNTER — OFFICE VISIT (OUTPATIENT)
Dept: NEUROLOGY | Facility: CLINIC | Age: 73
End: 2023-07-12
Payer: MEDICARE

## 2023-07-12 VITALS
SYSTOLIC BLOOD PRESSURE: 163 MMHG | WEIGHT: 183 LBS | DIASTOLIC BLOOD PRESSURE: 90 MMHG | BODY MASS INDEX: 27.11 KG/M2 | HEIGHT: 69 IN

## 2023-07-12 DIAGNOSIS — I63.9 ACUTE CVA (CEREBROVASCULAR ACCIDENT) (HCC): Primary | ICD-10-CM

## 2023-07-12 DIAGNOSIS — R47.9 DIFFICULTY WITH SPEECH: ICD-10-CM

## 2023-07-12 DIAGNOSIS — R20.0 FACIAL NUMBNESS: ICD-10-CM

## 2023-07-12 PROCEDURE — 3008F BODY MASS INDEX DOCD: CPT | Performed by: STUDENT IN AN ORGANIZED HEALTH CARE EDUCATION/TRAINING PROGRAM

## 2023-07-12 PROCEDURE — 1159F MED LIST DOCD IN RCRD: CPT | Performed by: STUDENT IN AN ORGANIZED HEALTH CARE EDUCATION/TRAINING PROGRAM

## 2023-07-12 PROCEDURE — 3077F SYST BP >= 140 MM HG: CPT | Performed by: STUDENT IN AN ORGANIZED HEALTH CARE EDUCATION/TRAINING PROGRAM

## 2023-07-12 PROCEDURE — 99213 OFFICE O/P EST LOW 20 MIN: CPT | Performed by: STUDENT IN AN ORGANIZED HEALTH CARE EDUCATION/TRAINING PROGRAM

## 2023-07-12 PROCEDURE — 1160F RVW MEDS BY RX/DR IN RCRD: CPT | Performed by: STUDENT IN AN ORGANIZED HEALTH CARE EDUCATION/TRAINING PROGRAM

## 2023-07-12 PROCEDURE — 1111F DSCHRG MED/CURRENT MED MERGE: CPT | Performed by: STUDENT IN AN ORGANIZED HEALTH CARE EDUCATION/TRAINING PROGRAM

## 2023-07-12 PROCEDURE — 3080F DIAST BP >= 90 MM HG: CPT | Performed by: STUDENT IN AN ORGANIZED HEALTH CARE EDUCATION/TRAINING PROGRAM

## 2023-07-12 NOTE — TELEPHONE ENCOUNTER
Patient has a upcoming apt with Myrna Mckeon and due to HMO ins patient needs a referral .  Please creat a referral and approve for 4 visit .     Thanks

## 2023-07-18 ENCOUNTER — HOSPITAL ENCOUNTER (OUTPATIENT)
Dept: CV DIAGNOSTICS | Facility: HOSPITAL | Age: 73
Discharge: HOME | End: 2023-07-18
Attending: STUDENT IN AN ORGANIZED HEALTH CARE EDUCATION/TRAINING PROGRAM
Payer: MEDICARE

## 2023-07-18 DIAGNOSIS — G45.9 BRAIN TIA: ICD-10-CM

## 2023-07-18 PROCEDURE — 93306 TTE W/DOPPLER COMPLETE: CPT | Performed by: STUDENT IN AN ORGANIZED HEALTH CARE EDUCATION/TRAINING PROGRAM

## 2023-07-26 ENCOUNTER — HOSPITAL ENCOUNTER (OUTPATIENT)
Dept: CT IMAGING | Facility: HOSPITAL | Age: 73
Discharge: HOME | End: 2023-07-26
Attending: STUDENT IN AN ORGANIZED HEALTH CARE EDUCATION/TRAINING PROGRAM
Payer: MEDICARE

## 2023-07-26 DIAGNOSIS — R20.0 FACIAL NUMBNESS: ICD-10-CM

## 2023-07-26 DIAGNOSIS — R47.9 DIFFICULTY WITH SPEECH: ICD-10-CM

## 2023-07-26 DIAGNOSIS — I63.9 ACUTE CVA (CEREBROVASCULAR ACCIDENT) (HCC): ICD-10-CM

## 2023-07-26 PROCEDURE — 70498 CT ANGIOGRAPHY NECK: CPT | Performed by: STUDENT IN AN ORGANIZED HEALTH CARE EDUCATION/TRAINING PROGRAM

## 2023-07-26 PROCEDURE — 70496 CT ANGIOGRAPHY HEAD: CPT | Performed by: STUDENT IN AN ORGANIZED HEALTH CARE EDUCATION/TRAINING PROGRAM

## 2023-08-15 ENCOUNTER — OFFICE VISIT (OUTPATIENT)
Dept: NEUROLOGY | Facility: CLINIC | Age: 73
End: 2023-08-15
Payer: MEDICARE

## 2023-08-15 VITALS
WEIGHT: 182 LBS | SYSTOLIC BLOOD PRESSURE: 144 MMHG | HEIGHT: 69 IN | BODY MASS INDEX: 26.96 KG/M2 | OXYGEN SATURATION: 97 % | HEART RATE: 77 BPM | DIASTOLIC BLOOD PRESSURE: 86 MMHG

## 2023-08-15 DIAGNOSIS — R20.0 FACIAL NUMBNESS: ICD-10-CM

## 2023-08-15 DIAGNOSIS — I63.9 ACUTE CVA (CEREBROVASCULAR ACCIDENT) (HCC): Primary | ICD-10-CM

## 2023-08-15 DIAGNOSIS — R47.9 DIFFICULTY WITH SPEECH: ICD-10-CM

## 2023-08-15 PROCEDURE — 1159F MED LIST DOCD IN RCRD: CPT | Performed by: STUDENT IN AN ORGANIZED HEALTH CARE EDUCATION/TRAINING PROGRAM

## 2023-08-15 PROCEDURE — 1160F RVW MEDS BY RX/DR IN RCRD: CPT | Performed by: STUDENT IN AN ORGANIZED HEALTH CARE EDUCATION/TRAINING PROGRAM

## 2023-08-15 PROCEDURE — 99213 OFFICE O/P EST LOW 20 MIN: CPT | Performed by: STUDENT IN AN ORGANIZED HEALTH CARE EDUCATION/TRAINING PROGRAM

## 2023-08-15 PROCEDURE — 3008F BODY MASS INDEX DOCD: CPT | Performed by: STUDENT IN AN ORGANIZED HEALTH CARE EDUCATION/TRAINING PROGRAM

## 2023-08-15 PROCEDURE — 3079F DIAST BP 80-89 MM HG: CPT | Performed by: STUDENT IN AN ORGANIZED HEALTH CARE EDUCATION/TRAINING PROGRAM

## 2023-08-15 PROCEDURE — 3077F SYST BP >= 140 MM HG: CPT | Performed by: STUDENT IN AN ORGANIZED HEALTH CARE EDUCATION/TRAINING PROGRAM

## (undated) NOTE — LETTER
Hospital Discharge Documentation  Please phone to schedule a hospital follow up appointment.     From: [address] [facility] Hospitalist[facility] Office  Phone: [phone]    Patient discharged time/date: 3/23/2021  4:58 PM  Patient discharge disposition:  Home or Self of ischemia. CT angiogram of the carotids without significant flow-limiting stenosis or occlusions but did note some mild calcification. Patient was cleared for discharge. At time of interview, patient reported his symptoms were resolved.   Patient den mood    CULTURE:[TS.1]   No results found for this visit on 03/22/21. [TS.2]    IMAGING STUDIES: SOME MAY NEED FOLLOW UP WITH PCP[TS.1]   CT STROKE BRAIN (NO IV)(CPT=70450)    Result Date: 3/22/2021  CONCLUSION:  1.  No intracranial hemorrhage, large focal i at 8:06 PM     Finalized by (CST): Lincoln Rice MD on 3/22/2021 at 8:09 PM[TS.2]            LABS :[TS.1]     Lab Results   Component Value Date    WBC 6.0 03/23/2021    HGB 13.7 03/23/2021    HCT 40.2 03/23/2021    .0 03/23/2021    CREATSERUM 0           Where to Get Your Medications      These medications were sent to [facility] [id number] [facility], [place] [address] [address], [address] [place] [address], [address] [place] [address]    Phone: [phone]   · aspirin 81 MG Chew

## (undated) NOTE — LETTER
Date & Time: 7/2/2023, 4:07 PM  Patient: Felecia Joel  Encounter Provider(s):    Toñito Hale MD  [facility], [address], DO Antonia Chavez,          This certifies that Essie Layne, a patient at an [facility], am leaving the facility voluntarily and against the advice of my physician. I acknowledge that I have been:    1. informed that my physician believes that I need to receive care here;  2. informed that if I leave, I could become sicker or even die; and  3. provided discharge instructions consistent with my current diagnosis. I hereby release my physician, the facility, and its employees from all responsibility for any ill effects which may result from this action. __________________________________  Patient or authorized caregiver signature    __________________________________  RN signature    If no patient or patient representative signature was obtained, sign below to acknowledge that the form was reviewed with the patient and that the patient refused to sign.     __________________________________  RN signature

## (undated) NOTE — Clinical Note
Hello, would you be able to set up outpatient cardiac monitoring for this patient? Low likelihood of cardioembolic TIA but will be helpful.  Thank you, Gurmeet Shepherd (neurology)